# Patient Record
Sex: MALE | Race: WHITE | NOT HISPANIC OR LATINO | Employment: UNEMPLOYED | URBAN - METROPOLITAN AREA
[De-identification: names, ages, dates, MRNs, and addresses within clinical notes are randomized per-mention and may not be internally consistent; named-entity substitution may affect disease eponyms.]

---

## 2023-06-13 LAB
BASOPHILS # BLD AUTO: 0 10E3/UL (ref 0–0.2)
BASOPHILS NFR BLD AUTO: 0 %
EOSINOPHIL # BLD AUTO: 0 10E3/UL (ref 0–0.7)
EOSINOPHIL NFR BLD AUTO: 0 %
ERYTHROCYTE [DISTWIDTH] IN BLOOD BY AUTOMATED COUNT: 13 % (ref 10–15)
HCT VFR BLD AUTO: 44.4 % (ref 40–53)
HGB BLD-MCNC: 14.7 G/DL (ref 13.3–17.7)
IMM GRANULOCYTES # BLD: 0.1 10E3/UL
IMM GRANULOCYTES NFR BLD: 0 %
LYMPHOCYTES # BLD AUTO: 1.5 10E3/UL (ref 0.8–5.3)
LYMPHOCYTES NFR BLD AUTO: 10 %
MCH RBC QN AUTO: 28.4 PG (ref 26.5–33)
MCHC RBC AUTO-ENTMCNC: 33.1 G/DL (ref 31.5–36.5)
MCV RBC AUTO: 86 FL (ref 78–100)
MONOCYTES # BLD AUTO: 1.4 10E3/UL (ref 0–1.3)
MONOCYTES NFR BLD AUTO: 9 %
NEUTROPHILS # BLD AUTO: 12.8 10E3/UL (ref 1.6–8.3)
NEUTROPHILS NFR BLD AUTO: 81 %
NRBC # BLD AUTO: 0 10E3/UL
NRBC BLD AUTO-RTO: 0 /100
PLATELET # BLD AUTO: 340 10E3/UL (ref 150–450)
RBC # BLD AUTO: 5.18 10E6/UL (ref 4.4–5.9)
WBC # BLD AUTO: 15.8 10E3/UL (ref 4–11)

## 2023-06-13 PROCEDURE — 85025 COMPLETE CBC W/AUTO DIFF WBC: CPT | Performed by: EMERGENCY MEDICINE

## 2023-06-13 PROCEDURE — 83690 ASSAY OF LIPASE: CPT | Performed by: EMERGENCY MEDICINE

## 2023-06-13 PROCEDURE — 36415 COLL VENOUS BLD VENIPUNCTURE: CPT | Performed by: EMERGENCY MEDICINE

## 2023-06-13 PROCEDURE — 80053 COMPREHEN METABOLIC PANEL: CPT | Performed by: EMERGENCY MEDICINE

## 2023-06-13 PROCEDURE — 99283 EMERGENCY DEPT VISIT LOW MDM: CPT | Mod: 25

## 2023-06-14 ENCOUNTER — HOSPITAL ENCOUNTER (EMERGENCY)
Facility: CLINIC | Age: 28
Discharge: HOME | End: 2023-06-14
Attending: EMERGENCY MEDICINE | Admitting: EMERGENCY MEDICINE

## 2023-06-14 VITALS
DIASTOLIC BLOOD PRESSURE: 88 MMHG | TEMPERATURE: 98.5 F | HEART RATE: 102 BPM | OXYGEN SATURATION: 98 % | SYSTOLIC BLOOD PRESSURE: 150 MMHG | HEIGHT: 78 IN | BODY MASS INDEX: 36.45 KG/M2 | WEIGHT: 315 LBS | RESPIRATION RATE: 18 BRPM

## 2023-06-14 DIAGNOSIS — R11.10 VOMITING AND DIARRHEA: ICD-10-CM

## 2023-06-14 DIAGNOSIS — R19.7 VOMITING AND DIARRHEA: ICD-10-CM

## 2023-06-14 LAB
ALBUMIN SERPL BCG-MCNC: 3.8 G/DL (ref 3.5–5.2)
ALP SERPL-CCNC: 74 U/L (ref 40–129)
ALT SERPL W P-5'-P-CCNC: 47 U/L (ref 0–70)
ANION GAP SERPL CALCULATED.3IONS-SCNC: 17 MMOL/L (ref 7–15)
AST SERPL W P-5'-P-CCNC: 29 U/L (ref 0–45)
BILIRUB SERPL-MCNC: 0.9 MG/DL
BUN SERPL-MCNC: 5.9 MG/DL (ref 6–20)
CALCIUM SERPL-MCNC: 9.1 MG/DL (ref 8.6–10)
CHLORIDE SERPL-SCNC: 100 MMOL/L (ref 98–107)
CREAT SERPL-MCNC: 0.72 MG/DL (ref 0.67–1.17)
DEPRECATED HCO3 PLAS-SCNC: 22 MMOL/L (ref 22–29)
GFR SERPL CREATININE-BSD FRML MDRD: >90 ML/MIN/1.73M2
GLUCOSE SERPL-MCNC: 133 MG/DL (ref 70–99)
HOLD SPECIMEN: NORMAL
HOLD SPECIMEN: NORMAL
LIPASE SERPL-CCNC: 16 U/L (ref 13–60)
POTASSIUM SERPL-SCNC: 3.5 MMOL/L (ref 3.4–5.3)
PROT SERPL-MCNC: 7.6 G/DL (ref 6.4–8.3)
SODIUM SERPL-SCNC: 139 MMOL/L (ref 136–145)

## 2023-06-14 PROCEDURE — 96360 HYDRATION IV INFUSION INIT: CPT

## 2023-06-14 PROCEDURE — 258N000003 HC RX IP 258 OP 636: Performed by: EMERGENCY MEDICINE

## 2023-06-14 RX ORDER — ONDANSETRON 4 MG/1
4 TABLET, ORALLY DISINTEGRATING ORAL EVERY 6 HOURS PRN
Qty: 10 TABLET | Refills: 0 | Status: SHIPPED | OUTPATIENT
Start: 2023-06-14 | End: 2023-06-17

## 2023-06-14 RX ADMIN — SODIUM CHLORIDE 1000 ML: 9 INJECTION, SOLUTION INTRAVENOUS at 00:50

## 2023-06-14 ASSESSMENT — ACTIVITIES OF DAILY LIVING (ADL): ADLS_ACUITY_SCORE: 35

## 2023-06-14 NOTE — ED TRIAGE NOTES
Patient presents with upper abdominal pain, fever, vomiting, and diarrhea that started on Monday night. He reports being unable to keep anything down.  He describes the abdominal pain as intermittent and sharp.       Triage Assessment     Row Name 06/13/23 4438       Triage Assessment (Adult)    Airway WDL WDL       Respiratory WDL    Respiratory WDL WDL       Skin Circulation/Temperature WDL    Skin Circulation/Temperature WDL WDL       Cardiac WDL    Cardiac WDL X;rhythm     Pulse Rate & Regularity tachycardic        Peripheral/Neurovascular WDL    Peripheral Neurovascular WDL WDL       Cognitive/Neuro/Behavioral WDL    Cognitive/Neuro/Behavioral WDL WDL

## 2023-06-14 NOTE — DISCHARGE INSTRUCTIONS
Zofran as needed for nausea  Imodium as needed for diarrhea  Maintain adequate hydration  Tylenol and/or ibuprofen as needed for pain control

## 2023-06-14 NOTE — ED PROVIDER NOTES
"  History     Chief Complaint:  Abdominal Pain     HPI   Tej Chatman is a 27 year old male who presents to the ED for upper abdominal pain. The patient reports he developed sharp, intermittent upper abdominal pain two days ago with associated nausea, vomiting, and watery diarrhea. Three days ago he had chills and felt warm but did not take his temperature. He has attempted to drinking fluids but notes he vomits this up. He states his vomiting was worse yesterday with roughly 6 episodes. He has had roughly three episodes of nonbloody diarrhea today. The patient has used OTC antinausea medications. He denies known ill contacts.    Independent Historian:   None - Patient Only    Review of External Notes:   N/A    Medications:    No Daily Medications    Past Medical History:    No past medical history     Past Surgical History:    No past surgical history    Physical Exam     Patient Vitals for the past 24 hrs:   BP Temp Temp src Pulse Resp SpO2 Height Weight   06/14/23 0131 -- -- -- -- -- 98 % -- --   06/14/23 0130 (!) 150/88 -- -- 102 -- -- -- --   06/14/23 0100 -- -- -- -- -- 98 % -- --   06/14/23 0051 (!) 148/96 98.5  F (36.9  C) Oral -- -- 98 % -- --   06/13/23 2256 (!) 153/79 99.8  F (37.7  C) Oral (!) 131 18 98 % 2.032 m (6' 8\") (!) 182.9 kg (403 lb 3.2 oz)      Physical Exam  General: Alert and cooperative with exam. Patient in mild distress. Normal mentation. Obese  Head:  Scalp is NC/AT  Eyes:  No scleral icterus, PERRL  ENT:  The external nose and ears are normal. The oropharynx is normal and without erythema; mucus membranes are moist. Uvula midline, no evidence of deep space infection.  Neck:  Normal range of motion without rigidity.  CV:  Mild tachycardia with regular rhythm    No pathologic murmur   Resp:  Breath sounds are clear bilaterally    Non-labored, no retractions or accessory muscle use  GI:  Abdomen is soft, no distension. No peritoneal signs.     Mild diffuse abdominal tenderness.  MS:  No " lower extremity edema   Skin:  Warm and dry, No rash or lesions noted.  Neuro: Oriented x 3. No gross motor deficits.    Emergency Department Course   Laboratory:  Labs Ordered and Resulted from Time of ED Arrival to Time of ED Departure   COMPREHENSIVE METABOLIC PANEL - Abnormal       Result Value    Sodium 139      Potassium 3.5      Chloride 100      Carbon Dioxide (CO2) 22      Anion Gap 17 (*)     Urea Nitrogen 5.9 (*)     Creatinine 0.72      Calcium 9.1      Glucose 133 (*)     Alkaline Phosphatase 74      AST 29      ALT 47      Protein Total 7.6      Albumin 3.8      Bilirubin Total 0.9      GFR Estimate >90     CBC WITH PLATELETS AND DIFFERENTIAL - Abnormal    WBC Count 15.8 (*)     RBC Count 5.18      Hemoglobin 14.7      Hematocrit 44.4      MCV 86      MCH 28.4      MCHC 33.1      RDW 13.0      Platelet Count 340      % Neutrophils 81      % Lymphocytes 10      % Monocytes 9      % Eosinophils 0      % Basophils 0      % Immature Granulocytes 0      NRBCs per 100 WBC 0      Absolute Neutrophils 12.8 (*)     Absolute Lymphocytes 1.5      Absolute Monocytes 1.4 (*)     Absolute Eosinophils 0.0      Absolute Basophils 0.0      Absolute Immature Granulocytes 0.1      Absolute NRBCs 0.0     LIPASE - Normal    Lipase 16        Emergency Department Course & Assessments:     Interventions:  Medications   0.9% sodium chloride BOLUS (0 mLs Intravenous Stopped 6/14/23 0129)      Assessments:  0058 Initial Examination    Independent Interpretation (X-rays, CTs, rhythm strip):  None    Consultations/Discussion of Management or Tests:  None      Social Determinants of Health affecting care:   None    Disposition:  The patient was discharged to home.     Impression & Plan    CMS Diagnoses: None    Medical Decision Making:  Tej Chatman is a 27 year old male who presents to the ED with nausea/vomiting, diarrhea, and abdominal pain. Differential diagnosis includes but is not limited to gastroenteritis, colitis,  bowel obstruction, diverticulitis, electrolyte abnormality, dehydration, UTI. ED evaluation includes reassuring labs though was noted to have mild leukocytosis ( likely stress/demargination reaction from vomiting/diarrhea and dehydration) and mildly elevated anion gap (likely secondary to starvation ketosis from poor oral intake).  Abdominal exam with only mild diffuse tenderness; no indication for CT imaging at this time.  He was noted to be tachycardic in this resolved with IV fluid administration; likely element of dehydration. The patient is tolerating oral fluids and feels well enough for discharge home. With reasonable clinical certainty I feel that the patient is safe for discharge home for ongoing evaluation and management as an outpatient.  Likely viral gastroenteritis.  Return precautions and supportive care discussed.    Diagnosis:    ICD-10-CM    1. Vomiting and diarrhea  R11.10     R19.7          Discharge Medications:  New Prescriptions    ONDANSETRON (ZOFRAN ODT) 4 MG ODT TAB    Take 1 tablet (4 mg) by mouth every 6 hours as needed for nausea      Scribe Disclosure:  I, Nilo Salcido, am serving as a scribe at 12:47 AM on 6/14/2023 to document services personally performed by Rashid Gandhi DO based on my observations and the provider's statements to me.     6/14/2023   Rashid Gandhi DO O'Neill, Christopher Warren, DO  06/14/23 0434

## 2023-06-20 ENCOUNTER — APPOINTMENT (OUTPATIENT)
Dept: CT IMAGING | Facility: CLINIC | Age: 28
DRG: 853 | End: 2023-06-20
Attending: EMERGENCY MEDICINE

## 2023-06-20 ENCOUNTER — HOSPITAL ENCOUNTER (INPATIENT)
Facility: CLINIC | Age: 28
LOS: 8 days | Discharge: HOME | DRG: 853 | End: 2023-06-29
Attending: EMERGENCY MEDICINE | Admitting: SURGERY

## 2023-06-20 ENCOUNTER — OFFICE VISIT (OUTPATIENT)
Dept: URGENT CARE | Facility: URGENT CARE | Age: 28
End: 2023-06-20

## 2023-06-20 VITALS
TEMPERATURE: 100 F | WEIGHT: 315 LBS | BODY MASS INDEX: 43.61 KG/M2 | HEART RATE: 144 BPM | OXYGEN SATURATION: 98 % | RESPIRATION RATE: 20 BRPM | DIASTOLIC BLOOD PRESSURE: 84 MMHG | SYSTOLIC BLOOD PRESSURE: 126 MMHG

## 2023-06-20 DIAGNOSIS — R10.11 BILATERAL UPPER ABDOMINAL PAIN: Primary | ICD-10-CM

## 2023-06-20 DIAGNOSIS — R50.9 FEVER, UNSPECIFIED FEVER CAUSE: ICD-10-CM

## 2023-06-20 DIAGNOSIS — R11.2 NAUSEA AND VOMITING, UNSPECIFIED VOMITING TYPE: ICD-10-CM

## 2023-06-20 DIAGNOSIS — R19.7 DIARRHEA, UNSPECIFIED TYPE: ICD-10-CM

## 2023-06-20 DIAGNOSIS — R10.12 BILATERAL UPPER ABDOMINAL PAIN: Primary | ICD-10-CM

## 2023-06-20 DIAGNOSIS — K63.1 PERFORATION BOWEL (H): ICD-10-CM

## 2023-06-20 DIAGNOSIS — R19.8 PERFORATED VISCUS: Primary | ICD-10-CM

## 2023-06-20 LAB
ALBUMIN SERPL BCG-MCNC: 3.4 G/DL (ref 3.5–5.2)
ALP SERPL-CCNC: 73 U/L (ref 40–129)
ALT SERPL W P-5'-P-CCNC: 35 U/L (ref 0–70)
ANION GAP SERPL CALCULATED.3IONS-SCNC: 14 MMOL/L (ref 7–15)
AST SERPL W P-5'-P-CCNC: 14 U/L (ref 0–45)
BASOPHILS # BLD AUTO: 0.1 10E3/UL (ref 0–0.2)
BASOPHILS NFR BLD AUTO: 0 %
BILIRUB SERPL-MCNC: 0.7 MG/DL
BUN SERPL-MCNC: 8.8 MG/DL (ref 6–20)
CALCIUM SERPL-MCNC: 9 MG/DL (ref 8.6–10)
CHLORIDE SERPL-SCNC: 97 MMOL/L (ref 98–107)
CREAT SERPL-MCNC: 0.94 MG/DL (ref 0.67–1.17)
DEPRECATED HCO3 PLAS-SCNC: 23 MMOL/L (ref 22–29)
EOSINOPHIL # BLD AUTO: 0 10E3/UL (ref 0–0.7)
EOSINOPHIL NFR BLD AUTO: 0 %
ERYTHROCYTE [DISTWIDTH] IN BLOOD BY AUTOMATED COUNT: 13.2 % (ref 10–15)
GFR SERPL CREATININE-BSD FRML MDRD: >90 ML/MIN/1.73M2
GLUCOSE SERPL-MCNC: 128 MG/DL (ref 70–99)
HCT VFR BLD AUTO: 45.5 % (ref 40–53)
HGB BLD-MCNC: 15.1 G/DL (ref 13.3–17.7)
IMM GRANULOCYTES # BLD: 0.2 10E3/UL
IMM GRANULOCYTES NFR BLD: 1 %
LIPASE SERPL-CCNC: 11 U/L (ref 13–60)
LYMPHOCYTES # BLD AUTO: 2 10E3/UL (ref 0.8–5.3)
LYMPHOCYTES NFR BLD AUTO: 10 %
MCH RBC QN AUTO: 28.3 PG (ref 26.5–33)
MCHC RBC AUTO-ENTMCNC: 33.2 G/DL (ref 31.5–36.5)
MCV RBC AUTO: 85 FL (ref 78–100)
MONOCYTES # BLD AUTO: 1.7 10E3/UL (ref 0–1.3)
MONOCYTES NFR BLD AUTO: 9 %
NEUTROPHILS # BLD AUTO: 15.9 10E3/UL (ref 1.6–8.3)
NEUTROPHILS NFR BLD AUTO: 80 %
NRBC # BLD AUTO: 0 10E3/UL
NRBC BLD AUTO-RTO: 0 /100
PLATELET # BLD AUTO: 406 10E3/UL (ref 150–450)
POTASSIUM SERPL-SCNC: 3.9 MMOL/L (ref 3.4–5.3)
PROT SERPL-MCNC: 7.5 G/DL (ref 6.4–8.3)
RADIOLOGIST FLAGS: ABNORMAL
RBC # BLD AUTO: 5.33 10E6/UL (ref 4.4–5.9)
SODIUM SERPL-SCNC: 134 MMOL/L (ref 136–145)
WBC # BLD AUTO: 19.8 10E3/UL (ref 4–11)

## 2023-06-20 PROCEDURE — 80053 COMPREHEN METABOLIC PANEL: CPT | Performed by: EMERGENCY MEDICINE

## 2023-06-20 PROCEDURE — 85025 COMPLETE CBC W/AUTO DIFF WBC: CPT | Performed by: EMERGENCY MEDICINE

## 2023-06-20 PROCEDURE — 258N000003 HC RX IP 258 OP 636: Performed by: EMERGENCY MEDICINE

## 2023-06-20 PROCEDURE — 250N000011 HC RX IP 250 OP 636: Performed by: EMERGENCY MEDICINE

## 2023-06-20 PROCEDURE — 83690 ASSAY OF LIPASE: CPT | Performed by: EMERGENCY MEDICINE

## 2023-06-20 PROCEDURE — 36415 COLL VENOUS BLD VENIPUNCTURE: CPT | Performed by: EMERGENCY MEDICINE

## 2023-06-20 PROCEDURE — 96374 THER/PROPH/DIAG INJ IV PUSH: CPT | Mod: 59

## 2023-06-20 PROCEDURE — 85014 HEMATOCRIT: CPT | Performed by: EMERGENCY MEDICINE

## 2023-06-20 PROCEDURE — 96361 HYDRATE IV INFUSION ADD-ON: CPT

## 2023-06-20 PROCEDURE — 99204 OFFICE O/P NEW MOD 45 MIN: CPT | Performed by: PHYSICIAN ASSISTANT

## 2023-06-20 PROCEDURE — 74177 CT ABD & PELVIS W/CONTRAST: CPT

## 2023-06-20 PROCEDURE — 99285 EMERGENCY DEPT VISIT HI MDM: CPT | Mod: 25

## 2023-06-20 PROCEDURE — 250N000009 HC RX 250: Performed by: EMERGENCY MEDICINE

## 2023-06-20 RX ORDER — IOPAMIDOL 755 MG/ML
135 INJECTION, SOLUTION INTRAVASCULAR ONCE
Status: COMPLETED | OUTPATIENT
Start: 2023-06-20 | End: 2023-06-20

## 2023-06-20 RX ADMIN — SODIUM CHLORIDE 79 ML: 9 INJECTION, SOLUTION INTRAVENOUS at 22:49

## 2023-06-20 RX ADMIN — HYDROMORPHONE HYDROCHLORIDE 1 MG: 1 INJECTION, SOLUTION INTRAMUSCULAR; INTRAVENOUS; SUBCUTANEOUS at 22:22

## 2023-06-20 RX ADMIN — SODIUM CHLORIDE 1000 ML: 9 INJECTION, SOLUTION INTRAVENOUS at 19:29

## 2023-06-20 RX ADMIN — IOPAMIDOL 135 ML: 755 INJECTION, SOLUTION INTRAVENOUS at 22:49

## 2023-06-20 RX ADMIN — SODIUM CHLORIDE 2000 ML: 9 INJECTION, SOLUTION INTRAVENOUS at 22:18

## 2023-06-20 ASSESSMENT — ACTIVITIES OF DAILY LIVING (ADL): ADLS_ACUITY_SCORE: 35

## 2023-06-20 ASSESSMENT — PAIN SCALES - GENERAL: PAINLEVEL: MODERATE PAIN (5)

## 2023-06-20 NOTE — PROGRESS NOTES
"  Assessment & Plan     Bilateral upper abdominal pain    Patient is having worsening and persistent upper abdominal pain, fever, nausea, unable to eat and having diarrhea.  He was seen in the ED a few days ago and was thought to have viral gastroenteritis.  But due to the worsening 9/10 abdominal pain when laying down, fever and persistent abdominal pain patient is being sent to the ED now for imaging, blood work and hydration     Fever, unspecified fever cause    Fever is persisting      Diarrhea, unspecified type    Diarrhea is persisting  Patient has been taking immodium    Nausea and vomiting, unspecified vomiting type    Nausea and vomiting come and go, seemed to have improved but now worsening again      Review of external notes as documented elsewhere in note      BMI:   Estimated body mass index is 43.61 kg/m  as calculated from the following:    Height as of 6/13/23: 2.032 m (6' 8\").    Weight as of this encounter: 180.1 kg (397 lb).     CONSULTATION/REFERRAL to ED for imaging, fluids and blood work    No follow-ups on file.    Josh Couch, [facility], PA-C  [facility] is a 27 year old, presenting for the following health issues:  Abdominal Pain (Feeling nausea, upper abdominal pain, laying down or getting up, vomiting and diarrhea since Saturday.)         View : No data to display.              HPI   Review of Systems   Constitutional, HEENT, cardiovascular, pulmonary, GI, , musculoskeletal, neuro, skin, endocrine and psych systems are negative, except as otherwise noted.      Objective    /84 (BP Location: Right arm, Patient Position: Sitting, Cuff Size: Adult Large)   Pulse (!) 144   Temp 100  F (37.8  C) (Tympanic)   Resp 20   Wt (!) 180.1 kg (397 lb)   SpO2 98%   BMI 43.61 kg/m    Body mass index is 43.61 kg/m .  Physical Exam   GENERAL: moderate distress with sweating and in pain  EYES: Eyes grossly normal to inspection, PERRL and " conjunctivae and sclerae normal  RESP: lungs clear to auscultation - no rales, rhonchi or wheezes  CV: regular rate and rhythm, normal S1 S2, no S3 or S4, no murmur, click or rub, no peripheral edema and peripheral pulses strong  ABDOMEN: tenderness epigastric, RUQ and LUQ  MS: no gross musculoskeletal defects noted, no edema  SKIN: no suspicious lesions or rashes  NEURO: Normal strength and tone, mentation intact and speech normal  PSYCH: mentation appears normal, affect normal/bright

## 2023-06-21 ENCOUNTER — ANESTHESIA EVENT (OUTPATIENT)
Dept: SURGERY | Facility: CLINIC | Age: 28
DRG: 853 | End: 2023-06-21

## 2023-06-21 ENCOUNTER — ANESTHESIA (OUTPATIENT)
Dept: SURGERY | Facility: CLINIC | Age: 28
DRG: 853 | End: 2023-06-21

## 2023-06-21 PROBLEM — R19.8 PERFORATED VISCUS: Status: ACTIVE | Noted: 2023-06-21

## 2023-06-21 LAB
ANION GAP SERPL CALCULATED.3IONS-SCNC: 16 MMOL/L (ref 7–15)
BUN SERPL-MCNC: 8.5 MG/DL (ref 6–20)
CALCIUM SERPL-MCNC: 7.9 MG/DL (ref 8.6–10)
CHLORIDE SERPL-SCNC: 101 MMOL/L (ref 98–107)
CREAT SERPL-MCNC: 0.82 MG/DL (ref 0.67–1.17)
CREAT SERPL-MCNC: 0.83 MG/DL (ref 0.67–1.17)
DEPRECATED HCO3 PLAS-SCNC: 19 MMOL/L (ref 22–29)
ERYTHROCYTE [DISTWIDTH] IN BLOOD BY AUTOMATED COUNT: 13.4 % (ref 10–15)
GFR SERPL CREATININE-BSD FRML MDRD: >90 ML/MIN/1.73M2
GFR SERPL CREATININE-BSD FRML MDRD: >90 ML/MIN/1.73M2
GLUCOSE BLDC GLUCOMTR-MCNC: 234 MG/DL (ref 70–99)
GLUCOSE BLDC GLUCOMTR-MCNC: 240 MG/DL (ref 70–99)
GLUCOSE BLDC GLUCOMTR-MCNC: 249 MG/DL (ref 70–99)
GLUCOSE SERPL-MCNC: 201 MG/DL (ref 70–99)
HBA1C MFR BLD: 5.7 %
HCT VFR BLD AUTO: 48.1 % (ref 40–53)
HGB BLD-MCNC: 15.9 G/DL (ref 13.3–17.7)
LACTATE SERPL-SCNC: 1.1 MMOL/L (ref 0.7–2)
MCH RBC QN AUTO: 28.6 PG (ref 26.5–33)
MCHC RBC AUTO-ENTMCNC: 33.1 G/DL (ref 31.5–36.5)
MCV RBC AUTO: 87 FL (ref 78–100)
PLATELET # BLD AUTO: 489 10E3/UL (ref 150–450)
POTASSIUM SERPL-SCNC: 4.2 MMOL/L (ref 3.4–5.3)
RBC # BLD AUTO: 5.55 10E6/UL (ref 4.4–5.9)
SODIUM SERPL-SCNC: 136 MMOL/L (ref 136–145)
WBC # BLD AUTO: 28.8 10E3/UL (ref 4–11)

## 2023-06-21 PROCEDURE — 258N000001 HC RX 258: Performed by: SURGERY

## 2023-06-21 PROCEDURE — 0DTJ0ZZ RESECTION OF APPENDIX, OPEN APPROACH: ICD-10-PCS | Performed by: SURGERY

## 2023-06-21 PROCEDURE — 88304 TISSUE EXAM BY PATHOLOGIST: CPT | Mod: 26 | Performed by: PATHOLOGY

## 2023-06-21 PROCEDURE — 710N000009 HC RECOVERY PHASE 1, LEVEL 1, PER MIN: Performed by: SURGERY

## 2023-06-21 PROCEDURE — 258N000003 HC RX IP 258 OP 636: Performed by: PHYSICIAN ASSISTANT

## 2023-06-21 PROCEDURE — 250N000009 HC RX 250: Performed by: NURSE ANESTHETIST, CERTIFIED REGISTERED

## 2023-06-21 PROCEDURE — 0DNU4ZZ RELEASE OMENTUM, PERCUTANEOUS ENDOSCOPIC APPROACH: ICD-10-PCS | Performed by: SURGERY

## 2023-06-21 PROCEDURE — 250N000011 HC RX IP 250 OP 636: Performed by: EMERGENCY MEDICINE

## 2023-06-21 PROCEDURE — 250N000025 HC SEVOFLURANE, PER MIN: Performed by: SURGERY

## 2023-06-21 PROCEDURE — 44950 APPENDECTOMY: CPT | Mod: AS | Performed by: PHYSICIAN ASSISTANT

## 2023-06-21 PROCEDURE — 80048 BASIC METABOLIC PNL TOTAL CA: CPT | Performed by: PHYSICIAN ASSISTANT

## 2023-06-21 PROCEDURE — 99221 1ST HOSP IP/OBS SF/LOW 40: CPT | Mod: 57 | Performed by: SURGERY

## 2023-06-21 PROCEDURE — 258N000003 HC RX IP 258 OP 636: Performed by: NURSE PRACTITIONER

## 2023-06-21 PROCEDURE — 250N000009 HC RX 250: Performed by: SURGERY

## 2023-06-21 PROCEDURE — 250N000011 HC RX IP 250 OP 636: Performed by: NURSE ANESTHETIST, CERTIFIED REGISTERED

## 2023-06-21 PROCEDURE — 83605 ASSAY OF LACTIC ACID: CPT | Performed by: NURSE PRACTITIONER

## 2023-06-21 PROCEDURE — 120N000001 HC R&B MED SURG/OB

## 2023-06-21 PROCEDURE — 999N000141 HC STATISTIC PRE-PROCEDURE NURSING ASSESSMENT: Performed by: SURGERY

## 2023-06-21 PROCEDURE — 250N000011 HC RX IP 250 OP 636: Performed by: ANESTHESIOLOGY

## 2023-06-21 PROCEDURE — 80048 BASIC METABOLIC PNL TOTAL CA: CPT | Performed by: NURSE PRACTITIONER

## 2023-06-21 PROCEDURE — 0DB80ZZ EXCISION OF SMALL INTESTINE, OPEN APPROACH: ICD-10-PCS | Performed by: SURGERY

## 2023-06-21 PROCEDURE — 250N000012 HC RX MED GY IP 250 OP 636 PS 637: Performed by: PHYSICIAN ASSISTANT

## 2023-06-21 PROCEDURE — 82565 ASSAY OF CREATININE: CPT | Performed by: PHYSICIAN ASSISTANT

## 2023-06-21 PROCEDURE — 36415 COLL VENOUS BLD VENIPUNCTURE: CPT | Performed by: NURSE PRACTITIONER

## 2023-06-21 PROCEDURE — 36415 COLL VENOUS BLD VENIPUNCTURE: CPT | Performed by: PHYSICIAN ASSISTANT

## 2023-06-21 PROCEDURE — 44950 APPENDECTOMY: CPT | Performed by: SURGERY

## 2023-06-21 PROCEDURE — 99231 SBSQ HOSP IP/OBS SF/LOW 25: CPT | Performed by: NURSE PRACTITIONER

## 2023-06-21 PROCEDURE — 250N000013 HC RX MED GY IP 250 OP 250 PS 637: Performed by: PHYSICIAN ASSISTANT

## 2023-06-21 PROCEDURE — 370N000017 HC ANESTHESIA TECHNICAL FEE, PER MIN: Performed by: SURGERY

## 2023-06-21 PROCEDURE — 258N000003 HC RX IP 258 OP 636: Performed by: NURSE ANESTHETIST, CERTIFIED REGISTERED

## 2023-06-21 PROCEDURE — 83036 HEMOGLOBIN GLYCOSYLATED A1C: CPT | Performed by: PHYSICIAN ASSISTANT

## 2023-06-21 PROCEDURE — 250N000011 HC RX IP 250 OP 636: Performed by: PHYSICIAN ASSISTANT

## 2023-06-21 PROCEDURE — 272N000001 HC OR GENERAL SUPPLY STERILE: Performed by: SURGERY

## 2023-06-21 PROCEDURE — 96375 TX/PRO/DX INJ NEW DRUG ADDON: CPT

## 2023-06-21 PROCEDURE — 360N000076 HC SURGERY LEVEL 3, PER MIN: Performed by: SURGERY

## 2023-06-21 PROCEDURE — 85027 COMPLETE CBC AUTOMATED: CPT | Performed by: NURSE PRACTITIONER

## 2023-06-21 PROCEDURE — 88304 TISSUE EXAM BY PATHOLOGIST: CPT | Mod: TC | Performed by: SURGERY

## 2023-06-21 RX ORDER — NALOXONE HYDROCHLORIDE 0.4 MG/ML
0.2 INJECTION, SOLUTION INTRAMUSCULAR; INTRAVENOUS; SUBCUTANEOUS
Status: DISCONTINUED | OUTPATIENT
Start: 2023-06-21 | End: 2023-06-29 | Stop reason: HOSPADM

## 2023-06-21 RX ORDER — DEXTROSE, SODIUM CHLORIDE, AND POTASSIUM CHLORIDE 5; .45; .15 G/100ML; G/100ML; G/100ML
2000 INJECTION INTRAVENOUS ONCE
Status: COMPLETED | OUTPATIENT
Start: 2023-06-21 | End: 2023-06-22

## 2023-06-21 RX ORDER — CEFAZOLIN SODIUM IN 0.9 % NACL 3 G/100 ML
INTRAVENOUS SOLUTION, PIGGYBACK (ML) INTRAVENOUS PRN
Status: DISCONTINUED | OUTPATIENT
Start: 2023-06-21 | End: 2023-06-21

## 2023-06-21 RX ORDER — FENTANYL CITRATE 50 UG/ML
50 INJECTION, SOLUTION INTRAMUSCULAR; INTRAVENOUS EVERY 5 MIN PRN
Status: DISCONTINUED | OUTPATIENT
Start: 2023-06-21 | End: 2023-06-21 | Stop reason: HOSPADM

## 2023-06-21 RX ORDER — DIPHENHYDRAMINE HYDROCHLORIDE 50 MG/ML
25 INJECTION INTRAMUSCULAR; INTRAVENOUS EVERY 6 HOURS PRN
Status: DISCONTINUED | OUTPATIENT
Start: 2023-06-21 | End: 2023-06-26

## 2023-06-21 RX ORDER — ENOXAPARIN SODIUM 100 MG/ML
40 INJECTION SUBCUTANEOUS EVERY 12 HOURS
Status: DISCONTINUED | OUTPATIENT
Start: 2023-06-21 | End: 2023-06-29 | Stop reason: HOSPADM

## 2023-06-21 RX ORDER — ONDANSETRON 4 MG/1
4 TABLET, ORALLY DISINTEGRATING ORAL EVERY 30 MIN PRN
Status: DISCONTINUED | OUTPATIENT
Start: 2023-06-21 | End: 2023-06-21 | Stop reason: HOSPADM

## 2023-06-21 RX ORDER — NALOXONE HYDROCHLORIDE 0.4 MG/ML
0.4 INJECTION, SOLUTION INTRAMUSCULAR; INTRAVENOUS; SUBCUTANEOUS
Status: DISCONTINUED | OUTPATIENT
Start: 2023-06-21 | End: 2023-06-29 | Stop reason: HOSPADM

## 2023-06-21 RX ORDER — HYDROMORPHONE HYDROCHLORIDE 1 MG/ML
0.3 INJECTION, SOLUTION INTRAMUSCULAR; INTRAVENOUS; SUBCUTANEOUS
Status: DISCONTINUED | OUTPATIENT
Start: 2023-06-21 | End: 2023-06-21

## 2023-06-21 RX ORDER — PROPOFOL 10 MG/ML
INJECTION, EMULSION INTRAVENOUS PRN
Status: DISCONTINUED | OUTPATIENT
Start: 2023-06-21 | End: 2023-06-21

## 2023-06-21 RX ORDER — LIDOCAINE 40 MG/G
CREAM TOPICAL
Status: DISCONTINUED | OUTPATIENT
Start: 2023-06-21 | End: 2023-06-29 | Stop reason: HOSPADM

## 2023-06-21 RX ORDER — HYDROMORPHONE HCL IN WATER/PF 6 MG/30 ML
0.2 PATIENT CONTROLLED ANALGESIA SYRINGE INTRAVENOUS EVERY 5 MIN PRN
Status: DISCONTINUED | OUTPATIENT
Start: 2023-06-21 | End: 2023-06-21 | Stop reason: HOSPADM

## 2023-06-21 RX ORDER — DIAZEPAM 10 MG/2ML
2.5 INJECTION, SOLUTION INTRAMUSCULAR; INTRAVENOUS EVERY 6 HOURS PRN
Status: DISCONTINUED | OUTPATIENT
Start: 2023-06-21 | End: 2023-06-27

## 2023-06-21 RX ORDER — PIPERACILLIN SODIUM, TAZOBACTAM SODIUM 3; .375 G/15ML; G/15ML
3.38 INJECTION, POWDER, LYOPHILIZED, FOR SOLUTION INTRAVENOUS EVERY 6 HOURS
Status: DISCONTINUED | OUTPATIENT
Start: 2023-06-21 | End: 2023-06-27

## 2023-06-21 RX ORDER — ACETAMINOPHEN 325 MG/1
650 TABLET ORAL EVERY 4 HOURS PRN
Status: DISCONTINUED | OUTPATIENT
Start: 2023-06-21 | End: 2023-06-25

## 2023-06-21 RX ORDER — ONDANSETRON 2 MG/ML
4 INJECTION INTRAMUSCULAR; INTRAVENOUS EVERY 30 MIN PRN
Status: DISCONTINUED | OUTPATIENT
Start: 2023-06-21 | End: 2023-06-21 | Stop reason: HOSPADM

## 2023-06-21 RX ORDER — SODIUM CHLORIDE, SODIUM LACTATE, POTASSIUM CHLORIDE, CALCIUM CHLORIDE 600; 310; 30; 20 MG/100ML; MG/100ML; MG/100ML; MG/100ML
INJECTION, SOLUTION INTRAVENOUS CONTINUOUS PRN
Status: DISCONTINUED | OUTPATIENT
Start: 2023-06-21 | End: 2023-06-21

## 2023-06-21 RX ORDER — SODIUM CHLORIDE, SODIUM LACTATE, POTASSIUM CHLORIDE, CALCIUM CHLORIDE 600; 310; 30; 20 MG/100ML; MG/100ML; MG/100ML; MG/100ML
INJECTION, SOLUTION INTRAVENOUS CONTINUOUS
Status: DISCONTINUED | OUTPATIENT
Start: 2023-06-21 | End: 2023-06-21 | Stop reason: HOSPADM

## 2023-06-21 RX ORDER — FENTANYL CITRATE 50 UG/ML
25 INJECTION, SOLUTION INTRAMUSCULAR; INTRAVENOUS EVERY 5 MIN PRN
Status: DISCONTINUED | OUTPATIENT
Start: 2023-06-21 | End: 2023-06-21 | Stop reason: HOSPADM

## 2023-06-21 RX ORDER — ONDANSETRON 2 MG/ML
INJECTION INTRAMUSCULAR; INTRAVENOUS PRN
Status: DISCONTINUED | OUTPATIENT
Start: 2023-06-21 | End: 2023-06-21

## 2023-06-21 RX ORDER — DEXAMETHASONE SODIUM PHOSPHATE 4 MG/ML
INJECTION, SOLUTION INTRA-ARTICULAR; INTRALESIONAL; INTRAMUSCULAR; INTRAVENOUS; SOFT TISSUE PRN
Status: DISCONTINUED | OUTPATIENT
Start: 2023-06-21 | End: 2023-06-21

## 2023-06-21 RX ORDER — ONDANSETRON 4 MG/1
4 TABLET, ORALLY DISINTEGRATING ORAL EVERY 6 HOURS PRN
Status: DISCONTINUED | OUTPATIENT
Start: 2023-06-21 | End: 2023-06-29 | Stop reason: HOSPADM

## 2023-06-21 RX ORDER — FENTANYL CITRATE 50 UG/ML
INJECTION, SOLUTION INTRAMUSCULAR; INTRAVENOUS PRN
Status: DISCONTINUED | OUTPATIENT
Start: 2023-06-21 | End: 2023-06-21

## 2023-06-21 RX ORDER — ONDANSETRON 2 MG/ML
4 INJECTION INTRAMUSCULAR; INTRAVENOUS EVERY 6 HOURS PRN
Status: DISCONTINUED | OUTPATIENT
Start: 2023-06-21 | End: 2023-06-29 | Stop reason: HOSPADM

## 2023-06-21 RX ORDER — HYDRALAZINE HYDROCHLORIDE 20 MG/ML
10 INJECTION INTRAMUSCULAR; INTRAVENOUS EVERY 6 HOURS PRN
Status: DISCONTINUED | OUTPATIENT
Start: 2023-06-21 | End: 2023-06-29 | Stop reason: HOSPADM

## 2023-06-21 RX ORDER — PIPERACILLIN SODIUM, TAZOBACTAM SODIUM 4; .5 G/20ML; G/20ML
4.5 INJECTION, POWDER, LYOPHILIZED, FOR SOLUTION INTRAVENOUS ONCE
Status: COMPLETED | OUTPATIENT
Start: 2023-06-21 | End: 2023-06-21

## 2023-06-21 RX ORDER — NICOTINE POLACRILEX 4 MG
15-30 LOZENGE BUCCAL
Status: DISCONTINUED | OUTPATIENT
Start: 2023-06-21 | End: 2023-06-29 | Stop reason: HOSPADM

## 2023-06-21 RX ORDER — HYDROMORPHONE HYDROCHLORIDE 1 MG/ML
0.5 INJECTION, SOLUTION INTRAMUSCULAR; INTRAVENOUS; SUBCUTANEOUS
Status: DISCONTINUED | OUTPATIENT
Start: 2023-06-21 | End: 2023-06-21

## 2023-06-21 RX ORDER — HYDROMORPHONE HCL IN WATER/PF 6 MG/30 ML
0.4 PATIENT CONTROLLED ANALGESIA SYRINGE INTRAVENOUS EVERY 5 MIN PRN
Status: DISCONTINUED | OUTPATIENT
Start: 2023-06-21 | End: 2023-06-21 | Stop reason: HOSPADM

## 2023-06-21 RX ORDER — PROCHLORPERAZINE MALEATE 10 MG
10 TABLET ORAL EVERY 6 HOURS PRN
Status: DISCONTINUED | OUTPATIENT
Start: 2023-06-21 | End: 2023-06-29 | Stop reason: HOSPADM

## 2023-06-21 RX ORDER — DEXTROSE MONOHYDRATE, SODIUM CHLORIDE, AND POTASSIUM CHLORIDE 50; 1.49; 4.5 G/1000ML; G/1000ML; G/1000ML
INJECTION, SOLUTION INTRAVENOUS CONTINUOUS
Status: DISCONTINUED | OUTPATIENT
Start: 2023-06-21 | End: 2023-06-28

## 2023-06-21 RX ORDER — LIDOCAINE HYDROCHLORIDE 20 MG/ML
INJECTION, SOLUTION INFILTRATION; PERINEURAL PRN
Status: DISCONTINUED | OUTPATIENT
Start: 2023-06-21 | End: 2023-06-21

## 2023-06-21 RX ORDER — FAMOTIDINE 20 MG/1
20 TABLET, FILM COATED ORAL 2 TIMES DAILY
Status: DISCONTINUED | OUTPATIENT
Start: 2023-06-21 | End: 2023-06-29

## 2023-06-21 RX ORDER — DIPHENHYDRAMINE HCL 25 MG
25 CAPSULE ORAL EVERY 6 HOURS PRN
Status: DISCONTINUED | OUTPATIENT
Start: 2023-06-21 | End: 2023-06-26

## 2023-06-21 RX ORDER — HYDROMORPHONE HYDROCHLORIDE 1 MG/ML
INJECTION, SOLUTION INTRAMUSCULAR; INTRAVENOUS; SUBCUTANEOUS PRN
Status: DISCONTINUED | OUTPATIENT
Start: 2023-06-21 | End: 2023-06-21

## 2023-06-21 RX ORDER — SODIUM CHLORIDE, SODIUM LACTATE, POTASSIUM CHLORIDE, CALCIUM CHLORIDE 600; 310; 30; 20 MG/100ML; MG/100ML; MG/100ML; MG/100ML
INJECTION, SOLUTION INTRAVENOUS CONTINUOUS
Status: DISCONTINUED | OUTPATIENT
Start: 2023-06-21 | End: 2023-06-21

## 2023-06-21 RX ORDER — ACETAMINOPHEN 650 MG/1
650 SUPPOSITORY RECTAL EVERY 4 HOURS PRN
Status: DISCONTINUED | OUTPATIENT
Start: 2023-06-21 | End: 2023-06-25

## 2023-06-21 RX ORDER — DEXTROSE MONOHYDRATE 25 G/50ML
25-50 INJECTION, SOLUTION INTRAVENOUS
Status: DISCONTINUED | OUTPATIENT
Start: 2023-06-21 | End: 2023-06-29 | Stop reason: HOSPADM

## 2023-06-21 RX ORDER — MAGNESIUM HYDROXIDE 1200 MG/15ML
LIQUID ORAL PRN
Status: DISCONTINUED | OUTPATIENT
Start: 2023-06-21 | End: 2023-06-21 | Stop reason: HOSPADM

## 2023-06-21 RX ADMIN — ROCURONIUM BROMIDE 20 MG: 50 INJECTION, SOLUTION INTRAVENOUS at 02:20

## 2023-06-21 RX ADMIN — SODIUM CHLORIDE, POTASSIUM CHLORIDE, SODIUM LACTATE AND CALCIUM CHLORIDE: 600; 310; 30; 20 INJECTION, SOLUTION INTRAVENOUS at 06:25

## 2023-06-21 RX ADMIN — PIPERACILLIN AND TAZOBACTAM 3.38 G: 3; .375 INJECTION, POWDER, FOR SOLUTION INTRAVENOUS at 21:29

## 2023-06-21 RX ADMIN — LIDOCAINE HYDROCHLORIDE 100 MG: 20 INJECTION, SOLUTION INFILTRATION; PERINEURAL at 01:12

## 2023-06-21 RX ADMIN — Medication 3 G: at 01:05

## 2023-06-21 RX ADMIN — SODIUM CHLORIDE, POTASSIUM CHLORIDE, SODIUM LACTATE AND CALCIUM CHLORIDE: 600; 310; 30; 20 INJECTION, SOLUTION INTRAVENOUS at 02:37

## 2023-06-21 RX ADMIN — INSULIN ASPART 2 UNITS: 100 INJECTION, SOLUTION INTRAVENOUS; SUBCUTANEOUS at 19:11

## 2023-06-21 RX ADMIN — HYDROMORPHONE HYDROCHLORIDE 0.5 MG: 1 INJECTION, SOLUTION INTRAMUSCULAR; INTRAVENOUS; SUBCUTANEOUS at 07:52

## 2023-06-21 RX ADMIN — SODIUM CHLORIDE, POTASSIUM CHLORIDE, SODIUM LACTATE AND CALCIUM CHLORIDE: 600; 310; 30; 20 INJECTION, SOLUTION INTRAVENOUS at 03:31

## 2023-06-21 RX ADMIN — PHENYLEPHRINE HYDROCHLORIDE 200 MCG: 10 INJECTION INTRAVENOUS at 01:12

## 2023-06-21 RX ADMIN — SODIUM CHLORIDE, POTASSIUM CHLORIDE, SODIUM LACTATE AND CALCIUM CHLORIDE 1000 ML: 600; 310; 30; 20 INJECTION, SOLUTION INTRAVENOUS at 08:56

## 2023-06-21 RX ADMIN — FENTANYL CITRATE 100 MCG: 50 INJECTION, SOLUTION INTRAMUSCULAR; INTRAVENOUS at 02:49

## 2023-06-21 RX ADMIN — ROCURONIUM BROMIDE 30 MG: 50 INJECTION, SOLUTION INTRAVENOUS at 01:30

## 2023-06-21 RX ADMIN — SUCCINYLCHOLINE CHLORIDE 180 MG: 20 INJECTION, SOLUTION INTRAMUSCULAR; INTRAVENOUS; PARENTERAL at 01:13

## 2023-06-21 RX ADMIN — ACETAMINOPHEN 650 MG: 650 SUPPOSITORY RECTAL at 08:51

## 2023-06-21 RX ADMIN — POTASSIUM CHLORIDE, DEXTROSE MONOHYDRATE AND SODIUM CHLORIDE 1000 ML: 150; 5; 450 INJECTION, SOLUTION INTRAVENOUS at 12:50

## 2023-06-21 RX ADMIN — ENOXAPARIN SODIUM 40 MG: 40 INJECTION SUBCUTANEOUS at 21:29

## 2023-06-21 RX ADMIN — HYDROMORPHONE HYDROCHLORIDE 0.5 MG: 1 INJECTION, SOLUTION INTRAMUSCULAR; INTRAVENOUS; SUBCUTANEOUS at 11:24

## 2023-06-21 RX ADMIN — HYDROMORPHONE HYDROCHLORIDE 0.5 MG: 1 INJECTION, SOLUTION INTRAMUSCULAR; INTRAVENOUS; SUBCUTANEOUS at 02:49

## 2023-06-21 RX ADMIN — PROPOFOL 250 MG: 10 INJECTION, EMULSION INTRAVENOUS at 01:12

## 2023-06-21 RX ADMIN — POTASSIUM CHLORIDE, DEXTROSE MONOHYDRATE AND SODIUM CHLORIDE 1000 ML: 150; 5; 450 INJECTION, SOLUTION INTRAVENOUS at 17:30

## 2023-06-21 RX ADMIN — ROCURONIUM BROMIDE 30 MG: 50 INJECTION, SOLUTION INTRAVENOUS at 01:45

## 2023-06-21 RX ADMIN — SODIUM CHLORIDE, POTASSIUM CHLORIDE, SODIUM LACTATE AND CALCIUM CHLORIDE: 600; 310; 30; 20 INJECTION, SOLUTION INTRAVENOUS at 08:44

## 2023-06-21 RX ADMIN — FENTANYL CITRATE 100 MCG: 50 INJECTION, SOLUTION INTRAMUSCULAR; INTRAVENOUS at 01:12

## 2023-06-21 RX ADMIN — ROCURONIUM BROMIDE 40 MG: 50 INJECTION, SOLUTION INTRAVENOUS at 01:21

## 2023-06-21 RX ADMIN — SUGAMMADEX 200 MG: 100 INJECTION, SOLUTION INTRAVENOUS at 03:49

## 2023-06-21 RX ADMIN — POTASSIUM CHLORIDE, DEXTROSE MONOHYDRATE AND SODIUM CHLORIDE: 150; 5; 450 INJECTION, SOLUTION INTRAVENOUS at 11:58

## 2023-06-21 RX ADMIN — FAMOTIDINE 20 MG: 10 INJECTION INTRAVENOUS at 07:53

## 2023-06-21 RX ADMIN — SODIUM CHLORIDE, POTASSIUM CHLORIDE, SODIUM LACTATE AND CALCIUM CHLORIDE: 600; 310; 30; 20 INJECTION, SOLUTION INTRAVENOUS at 09:54

## 2023-06-21 RX ADMIN — POTASSIUM CHLORIDE, DEXTROSE MONOHYDRATE AND SODIUM CHLORIDE: 150; 5; 450 INJECTION, SOLUTION INTRAVENOUS at 21:29

## 2023-06-21 RX ADMIN — PROPOFOL 50 MG: 10 INJECTION, EMULSION INTRAVENOUS at 01:33

## 2023-06-21 RX ADMIN — HYDROMORPHONE HYDROCHLORIDE 0.5 MG: 1 INJECTION, SOLUTION INTRAMUSCULAR; INTRAVENOUS; SUBCUTANEOUS at 06:31

## 2023-06-21 RX ADMIN — PIPERACILLIN AND TAZOBACTAM 4.5 G: 4; .5 INJECTION, POWDER, FOR SOLUTION INTRAVENOUS at 00:31

## 2023-06-21 RX ADMIN — Medication 1 LOZENGE: at 22:02

## 2023-06-21 RX ADMIN — PIPERACILLIN AND TAZOBACTAM 3.38 G: 3; .375 INJECTION, POWDER, FOR SOLUTION INTRAVENOUS at 14:44

## 2023-06-21 RX ADMIN — PIPERACILLIN AND TAZOBACTAM 3.38 G: 3; .375 INJECTION, POWDER, FOR SOLUTION INTRAVENOUS at 07:52

## 2023-06-21 RX ADMIN — DEXAMETHASONE SODIUM PHOSPHATE 4 MG: 4 INJECTION, SOLUTION INTRA-ARTICULAR; INTRALESIONAL; INTRAMUSCULAR; INTRAVENOUS; SOFT TISSUE at 01:17

## 2023-06-21 RX ADMIN — HYDROMORPHONE HYDROCHLORIDE 0.5 MG: 1 INJECTION, SOLUTION INTRAMUSCULAR; INTRAVENOUS; SUBCUTANEOUS at 01:35

## 2023-06-21 RX ADMIN — FAMOTIDINE 20 MG: 10 INJECTION INTRAVENOUS at 21:29

## 2023-06-21 RX ADMIN — ONDANSETRON 4 MG: 2 INJECTION INTRAMUSCULAR; INTRAVENOUS at 02:48

## 2023-06-21 RX ADMIN — MIDAZOLAM 2 MG: 1 INJECTION INTRAMUSCULAR; INTRAVENOUS at 01:06

## 2023-06-21 RX ADMIN — SODIUM CHLORIDE, POTASSIUM CHLORIDE, SODIUM LACTATE AND CALCIUM CHLORIDE: 600; 310; 30; 20 INJECTION, SOLUTION INTRAVENOUS at 01:05

## 2023-06-21 RX ADMIN — HYDROMORPHONE HYDROCHLORIDE 0.4 MG: 0.2 INJECTION, SOLUTION INTRAMUSCULAR; INTRAVENOUS; SUBCUTANEOUS at 04:41

## 2023-06-21 RX ADMIN — Medication: at 13:26

## 2023-06-21 ASSESSMENT — ACTIVITIES OF DAILY LIVING (ADL)
ADLS_ACUITY_SCORE: 28
ADLS_ACUITY_SCORE: 35
ADLS_ACUITY_SCORE: 28
ADLS_ACUITY_SCORE: 29
ADLS_ACUITY_SCORE: 28
ADLS_ACUITY_SCORE: 29
ADLS_ACUITY_SCORE: 28
ADLS_ACUITY_SCORE: 21
ADLS_ACUITY_SCORE: 28
ADLS_ACUITY_SCORE: 35
ADLS_ACUITY_SCORE: 35
ADLS_ACUITY_SCORE: 28

## 2023-06-21 ASSESSMENT — COPD QUESTIONNAIRES: COPD: 0

## 2023-06-21 ASSESSMENT — LIFESTYLE VARIABLES: TOBACCO_USE: 1

## 2023-06-21 NOTE — ANESTHESIA CARE TRANSFER NOTE
Patient: Tej Chatman    Procedure: Procedure(s):  Exploratory Laparoscopy; Converted to open; Appendectomy; Abdominal Lavage; Drain placement       Diagnosis: Bowel perforation (H) [K63.1]  Diagnosis Additional Information: No value filed.    Anesthesia Type:   General     Note:    Oropharynx: oropharynx clear of all foreign objects  Level of Consciousness: awake  Oxygen Supplementation: face mask  Level of Supplemental Oxygen (L/min / FiO2): 6  Independent Airway: airway patency satisfactory and stable  Dentition: dentition unchanged  Vital Signs Stable: post-procedure vital signs reviewed and stable  Report to RN Given: handoff report given  Patient transferred to: PACU    Handoff Report: Identifed the Patient, Identified the Reponsible Provider, Reviewed the pertinent medical history, Discussed the surgical course, Reviewed Intra-OP anesthesia mangement and issues during anesthesia, Set expectations for post-procedure period and Allowed opportunity for questions and acknowledgement of understanding      Vitals:  Vitals Value Taken Time   BP     Temp     Pulse     Resp     SpO2         Electronically Signed By: SHORTY Mares CRNA  June 21, 2023  4:02 AM  
She did not want anything done for her cancer. I spoke with her and her daughter and documented in the notes.  She also refused to see Dr Darryl Roy in the past (last year when this was first found)
left upper arm

## 2023-06-21 NOTE — CODE/RAPID RESPONSE
"[facility]    RRT Note  6/21/2023   Time Called: 0738am    RRT called for: Tachycardia, diaphoresis    Assessment & Plan   IMPRESSION & PLAN:    Tej Chatman is a 27 year old male w/ PMH of obesity,  who was admitted on 6/20/2023 for peritonitis after presenting with abdominal pain, nausea vomiting diarrhea and CT showed free air and fluid.  He underwent ex lap converted to open laparotomy with appendectomy and abdominal lavage.  Bowel was examined and appeared normal with out any definitive perforation identified.  He was started on Zosyn for peritonitis admitted to short stay because of lack of beds on general surgery unit.  Preoperatively patient received 3 L of crystalloid in the emergency department plus maintenance IV fluids in addition to 2 L crystalloid intraoperatively.      On initial exam on the a.m. of 6/21/2023 nursing staff reported that he \"looked unwell\" and was diaphoretic and tachycardic thus prompting RRT activation.  Of note patient was reported to be diaphoretic and tachycardic in the initial surgical consultation note.  Patient reports he feels \"not so great\" endorses abdominal pain 8/10 in severity.  He endorses thirst, denies dyspnea or chest pain.  Reports he was ill for 2 to 3 days prior to admission with anorexia and for 2 days prior to admission had 2-3 episodes of diarrhea per day.  He reports being fairly sedentary prior to admission but has no personal or family history of VTE.    Impression  Sepsis from peritonitis s/p surgical source control albeit no definitive perforation was identified.  He has at least 3 SIRS criteria with tachycardia, tachypnea and leukocytosis and he is on appropriate antimicrobials  Differential diagnosis:  -Considered hypovolemia.  There may be a component of this from insensible losses from anorexia, diarrhea, diaphoresis.  Have to consider bleeding although OUSMANE output is serosanguineous.  Pain may be playing a role.  Also " considered PE though it is quite early in his course but not impossible and other leading diagnoses are more likely    INTERVENTIONS:  -stat cbc--> WBCs 28,000 likely reflective of surgical stress, lactic acid within normal limits at 1.1  -add on remainder of BMP  -give prn apap now  -LR bolus 1000mL (c/o thirst, concentrated urine, likely large insensible losses as noted above)  -please place 2nd peripheral IV so IVF bolus can run concurrently while atbx infusing  -Vital signs every 4 hours    Working diagnosis: Likely ongoing sepsis response from peritonitis, hypovolemia and inadequate analgesia    At the end of the RRT: Lactic acid is reassuring.  Pain down to 3-4/10 after analgesia, heart rate 117, /88    disposition continue current level of care    I have reached out to, awaiting call back from surgical team and defer further cares to the general surgery team--> discussed w/ surgery attending MD and HNAN who will eval him shortly.      If no change in HR post additional volume and analgesia, rec obtaining CTA chest PE protocol to r/o PE    Addendum 0910:  BMP reviewed, notable for HCO3 19, AG 16, Cl is normal as is lactate  -might be 2/2 starvation ketoacidosis, rec starting po diet and/or dextrose containing solution     Code Status: Full Code    Allergies   Allergies   Allergen Reactions     Erythromycin Unknown       Physical Exam   Vital Signs with Ranges:  Temp:  [96.7  F (35.9  C)-100  F (37.8  C)] 97.9  F (36.6  C)  Pulse:  [112-144] 118  Resp:  [10-30] 24  BP: (123-166)/() 144/88  SpO2:  [93 %-98 %] 94 %  I/O last 3 completed shifts:  In: 2000 [I.V.:2000]  Out: 550 [Urine:500; Drains:50]    Constitutional: vs as above and/or per EMR  General:  adult pt lying in bed without acute distress   GCS:   Motor 6=Obeys commands   Verbal 5=Oriented   Eye Opening 4=Spontaneous   Total: 15     Neuro: +follows commands wiggle toes and show 2 fingers bilat, face symmetric, tongue midline, speech  fluent  Eyes pupils equal round 3mm briskly reactive bilat, sclera nonicteric, noninjected, conjunctiva pink,  Head, ENT & mouth: NC/AT,  Dry oral mucosa  Neck: supple  CV S1S2 ST on tele  resp: CTAB upper  lobes  gi:hypooactive bowel sounds, soft, nontender, nondisteded multiple operative intervention sites clean dry intact, OUSMANE x2 with serosanguineous drainage  Ext: no edema or mottling  Skin: no rashes on exposed skin  Lymph: defer  Musculoskeletal no bony joint deformities      Data       IMAGING: (X-ray/CT/MRI)   Recent Results (from the past 24 hour(s))   CT Abdomen Pelvis w Contrast   Result Value    Radiologist flags Perforated viscus (AA)    Narrative    EXAM: CT ABDOMEN PELVIS W CONTRAST  LOCATION: [facility]  DATE: 6/20/2023    INDICATION: diffuse abd pain, n. v d ongoing for over 1 week  COMPARISON: None.  TECHNIQUE: CT scan of the abdomen and pelvis was performed following injection of IV contrast. Multiplanar reformats were obtained. Dose reduction techniques were used.  CONTRAST: 139mL Isovue 370    FINDINGS:   LOWER CHEST: Normal.    HEPATOBILIARY: Diffuse hepatic steatosis. No significant mass. No bile duct dilatation. No calcified gallstones.    PANCREAS: No significant mass, duct dilatation, or inflammatory change.    SPLEEN: Normal size.    ADRENAL GLANDS: No significant nodules.    KIDNEYS/BLADDER: No significant mass, stone, or hydronephrosis.    BOWEL: Small to moderate volume pneumoperitoneum is seen overlying the left lobe of the liver with scattered foci of pneumoperitoneum seen in the abdomen anteriorly. The stomach and duodenum are normal in size and caliber with multiple dilated loops of   jejunum and ileum seen scattered throughout the abdomen with areas of bowel wall thickening and mesenteric stranding present most pronounced in the mid and inferior abdomen (image 80, series 3). No organized intra-abdominal fluid collection is   identified. The colon is  decompressed. The appendix is not visualized.    LYMPH NODES: There is an increased number of subcentimeter mesenteric lymph nodes.    VASCULATURE: Unremarkable.    PELVIC ORGANS: No pelvic masses.    MUSCULOSKELETAL: Unremarkable.      Impression    IMPRESSION:   1.  Multiple dilated loops of predominantly jejunum seen in the mid and lower abdomen w mural thickening, mesenteric stranding and small to moderate volume pneumoperitoneum, with the findings most pronounced in the mid abdomen, concerning for perforated   viscus although the exact site of perforation is unclear it is favored to arise from the small bowel. Mesenteric free fluid and stranding is present without evidence of definitive drainable abscess seen at this time.  2.  Hepatic steatosis      [Critical Result: Perforated viscus]    Finding was identified on 6/20/2023 11:09 PM CDT.     DR. Hay was contacted by me on 6/20/2023 11:20 PM CDT and verbalized understanding of the critical result.       CBC with Diff:  Recent Labs   Lab Test 06/21/23  0820   WBC 28.8*   HGB 15.9   MCV 87   *        Lactic Acid:    1.1    Comprehensive Metabolic Panel:  Recent Labs   Lab 06/21/23  0746 06/21/23  0633 06/20/23  1919   NA  --  136 134*   POTASSIUM  --  4.2 3.9   CHLORIDE  --  101 97*   CO2  --  19* 23   ANIONGAP  --  16* 14   * 201* 128*   BUN  --  8.5 8.8   CR  --  0.83  0.82 0.94   GFRESTIMATED  --  >90  >90 >90   TEMO  --  7.9* 9.0   PROTTOTAL  --   --  7.5   ALBUMIN  --   --  3.4*   BILITOTAL  --   --  0.7   ALKPHOS  --   --  73   AST  --   --  14   ALT  --   --  35     Time Spent on this Encounter   I spent 30 minutes ([id number])on the unit/floor managing the care of Tej Chatman. Over 50% of my time was spent counseling the patient and/or coordinating care regarding services listed in this note.    SHORTY [facility]  Hospitalist Service  [facility]  Securely message with Tractive (more info)  Text page via  AMCOM Paging/Directory

## 2023-06-21 NOTE — PROVIDER NOTIFICATION
MD Notification    Notified Person: MD    Notified Person Name:Summer Bustos NP    Notification Date/Time:06/21/23 6304    Notification Interaction: amcom    Purpose of Notification:Patient diaphoretic.  B/P elevated.  Increased pain.  Tachycardic. Currently on 5L O2 per NC.  (Post surgical patient --bowel  perforation.)      Orders Received:    Comments:  NP currently at bedside.

## 2023-06-21 NOTE — CONSULTS
General surgery consult note    We are being called by the emergency room to evaluate Mr. Chatman as he presents once more to the emergency room with abdominal pain nausea vomiting and diarrhea.  This time his white blood cell count is higher than his previous visit.  CT scan of the abdomen was obtained and free air and fluid is observed mainly in the upper abdomen and among loops of small intestine.  There is also inflammatory changes and several loops of small intestine.  He does take NSAIDs from time to time.  He vapes.  No previous abdominal operations.  On exam he is awake, alert, cooperative, in mild distress.  He is diaphoretic and tachycardic  His abdomen is tender to palpation slightly but not showing peritoneal signs at the moment.    I discussed with him and with his girlfriend the laboratory and radiologic findings.  The recommendations for abdominal exploration laparoscopically were discussed.  He understands the risk, benefits, hopeful outcome, possible complications, and would like to proceed.      Total encounter time 30 minutes, more than half spent in counseling, review of data, and coordination of care.

## 2023-06-21 NOTE — PLAN OF CARE
Goal Outcome Evaluation:  Exploratory Laparoscopy converted to open laparotomy; Appendectomy; Abdominal Lavage; Drain placement  Orientation/Cognitive: A&Ox4  Observation Goals (Met/ Not Met)Inpt  Mobility Level/Assist Equipment:Not OOB  Fall Risk (Y/N): Y  Behavior Concerns: None  Pain Management: Dilaudid IV   Tele/VS/O2: VSS except for HTN  ABNL Lab/BG:AM labs  Diet:NPO except for ice chips  Bowel/Bladder:[place] in place  Skin Concerns: ABD incision, 2 OUSMANE drains  Drains/Devices: R PIV infusing LR  Tests/Procedures for next shift: AM labs  Anticipated DC date & active delays: TBD

## 2023-06-21 NOTE — ED TRIAGE NOTES
Patient here with abdominal pain,nausea ,vomiting and diarrhea since sunday     Triage Assessment     Row Name 06/20/23 1916       Triage Assessment (Adult)    Airway WDL WDL       Respiratory WDL    Respiratory WDL WDL       Skin Circulation/Temperature WDL    Skin Circulation/Temperature WDL WDL       Cardiac WDL    Cardiac WDL WDL       Peripheral/Neurovascular WDL    Peripheral Neurovascular WDL WDL       Cognitive/Neuro/Behavioral WDL    Cognitive/Neuro/Behavioral WDL WDL

## 2023-06-21 NOTE — BRIEF OP NOTE
[facility]    Brief Operative Note    Pre-operative diagnosis: Bowel perforation (H) [K63.1]  Post-operative diagnosis Intra-abdominal free air and free fluid with small bowel dilatation    Procedure:    Exploratory Laparoscopy converted to open laparotomy; Appendectomy; Abdominal Lavage; Drain placement    Surgeon:      * Teddy Bruce MD - Primary     * Linh Watt PA-C - Assisting     Anesthesia: General     Estimated Blood Loss: 40cc    Specimens:   ID Type Source Tests Collected by Time Destination   1 : Appendix Tissue Appendix SURGICAL PATHOLOGY EXAM Teddy Bruce MD 6/21/2023  2:45 AM      Findings:   Moderate amount of cloudy fluid throughout abdomen. Stomach and visible duodenum healthy without any inflammation. Gallbladder WNL. Appendix somewhat dilated and hyperemic but appeared reactive, not perforated. Appendectomy performed. Bowel and colon examined thoroughly. Significant inflammation with fibrinous debris around one area of small bowel. No definite perforation identified. Serosa friable, but bowel actively peristalsing and viable; vistaseal applied to this area. Abdomen copiously irrigated. 2 drains placed, one adjacent to inflamed area of small bowel; second drain in pelvis. NG tube verified within stomach. No immediate complications. See operative report for full details.      Linh Watt PA-C  [facility]  [phone]

## 2023-06-21 NOTE — PLAN OF CARE
AO. 3L NC. Tachy, tachypneic, diaphoretic. Afebrile. Mid abdominal surgical site: CDI dressing; OUSMANE X2: bright red output. Indwelling alexandre cath: adequate output. NG to low int suction: light brown. IS use encouraged. Girlfriend at bedside. Surgery following. PRN dilaudid q 2: not effective. PRN valium available. NPO ex ice chips. 1L bolus given. D5 1/2 NS + 20 meq @ 150, IV zosyn. BG checks q 4: sliding scale insulin ordered. Report given to RN. Patient transferred YADIRA.

## 2023-06-21 NOTE — ANESTHESIA PROCEDURE NOTES
Airway       Patient location during procedure: OR ([facility] - Operating Room or Procedural Area)       Procedure Start/Stop Times: 6/21/2023 1:15 AM  Staff -        Anesthesiologist:  Amauri Mclaughlin MD       CRNA: Lauren Hsu APRN CRNA       Performed By: CRNA  Consent for Airway        Urgency: emergent       Consent: No emergent situation. Verbal consent obtained. Written consent obtained.       Consent given by: patient       Risks and benefits: risks, benefits and alternatives were discussed       Patient identity confirmed: verbally with patient, arm band and hospital-assigned identification number  Indications and Patient Condition       Indications for airway management: tim-procedural       Induction type:intravenous       Mask difficulty assessment: 0 - not attempted    Final Airway Details       Final airway type: endotracheal airway       Successful airway: ETT - single  Endotracheal Airway Details        ETT size (mm): 8.5       Cuffed: yes       Cuff volume (mL): 8       Successful intubation technique: video laryngoscopy       VL Blade Size: MAC 4       Grade View of Cords: 1       Adjucts: stylet       Position: Right       Measured from: gums/teeth       Secured at (cm): 24       Bite block used: None    Post intubation assessment        Number of attempts at approach: 1       Number of other approaches attempted: 0       Secured with: pink tape       Ease of procedure: easy       Dentition: Intact and Unchanged    Medication(s) Administered   Medication Administration Time: 6/21/2023 1:15 AM

## 2023-06-21 NOTE — PLAN OF CARE
Turn and repo, not out of bed yet. [place].  NPO. NG LIS. Pain 6/10, PCA pump started. Incision CDI. JPx2 with serosanguinous output. BP meds available PRN, not given. IVF bolus infusing.

## 2023-06-21 NOTE — ED NOTES
Proceeded to pre op after updates given to the patient.  Iv meds commenced as ordered.  Remained tachycardic,pain scale 2/10.

## 2023-06-21 NOTE — PROGRESS NOTES
Notified provider about indwelling alexandre catheter discussed removal or continued need.    Did provider choose to remove indwelling alexandre catheter? No    Provider's alexandre indication for keeping indwelling alexandre catheter: other    Is there an order for indwelling alexandre catheter? No    *If there is a plan to keep alexandre catheter in place at discharge daily notification with provider is not necessary, but please add a notation in the treatment team sticky note that the patient will be discharging with the catheter.

## 2023-06-21 NOTE — PHARMACY-ADMISSION MEDICATION HISTORY
Pharmacist Admission Medication History    Admission medication history is complete. The information provided in this note is only as accurate as the sources available at the time of the update.    Medication reconciliation/reorder completed by provider prior to medication history? No    Information Source(s): Patient and CareEverywhere/SureScripts via in-person    Pertinent Information: Pt reported not taking any prescription or OTC meds at this time.    Changes made to PTA medication list:    Added: None    Deleted: None    Changed: None    Medication Affordability:  Not including over the counter (OTC) medications, was there a time in the past 3 months when you did not take your medications as prescribed because of cost?: No    Allergies reviewed with patient and updates made in EHR: yes    Medication History Completed By: Zainab Joel RPH 6/21/2023 8:52 AM    Prior to Admission medications    Not on File

## 2023-06-21 NOTE — ANESTHESIA PREPROCEDURE EVALUATION
Anesthesia Pre-Procedure Evaluation    Patient: Tej Chatman   MRN: [id number] : 1995        Procedure : Procedure(s):  Exploratory Laparoscopy          No past medical history on file.   No past surgical history on file.   Allergies   Allergen Reactions     Erythromycin Unknown      Social History     Tobacco Use     Smoking status: Former     Types: Cigarettes, Vaping Device     Smokeless tobacco: Never   Vaping Use     Vaping status: Not on file   Substance Use Topics     Alcohol use: Not on file      Wt Readings from Last 1 Encounters:   23 (!) 181.4 kg (400 lb)        Anesthesia Evaluation   Pt has not had prior anesthetic         ROS/MED HX  ENT/Pulmonary:     (+) tobacco use, Past use,  (-) asthma, COPD and sleep apnea   Neurologic:  - neg neurologic ROS     Cardiovascular:  - neg cardiovascular ROS  (-) murmur   METS/Exercise Tolerance: >4 METS    Hematologic:  - neg hematologic  ROS     Musculoskeletal:  - neg musculoskeletal ROS     GI/Hepatic: Comment: Bowel perforation   (-) GERD and liver disease   Renal/Genitourinary:  - neg Renal ROS     Endo:     (+) Obesity,     Psychiatric/Substance Use:  - neg psychiatric ROS     Infectious Disease:  - neg infectious disease ROS     Malignancy:  - neg malignancy ROS     Other:  - neg other ROS          Physical Exam    Airway        Mallampati: II   TM distance: > 3 FB   Neck ROM: full   Mouth opening: > 3 cm    Respiratory Devices and Support         Dental       (+) Completely normal teeth      Cardiovascular          Rhythm and rate: regular and tachycardia (-) no murmur    Pulmonary           breath sounds clear to auscultation           OUTSIDE LABS:  CBC:   Lab Results   Component Value Date    WBC 19.8 (H) 2023    WBC 15.8 (H) 2023    HGB 15.1 2023    HGB 14.7 2023    HCT 45.5 2023    HCT 44.4 2023     2023     2023     BMP:   Lab Results   Component Value Date     (L)  06/20/2023     06/13/2023    POTASSIUM 3.9 06/20/2023    POTASSIUM 3.5 06/13/2023    CHLORIDE 97 (L) 06/20/2023    CHLORIDE 100 06/13/2023    CO2 23 06/20/2023    CO2 22 06/13/2023    BUN 8.8 06/20/2023    BUN 5.9 (L) 06/13/2023    CR 0.94 06/20/2023    CR 0.72 06/13/2023     (H) 06/20/2023     (H) 06/13/2023     COAGS: No results found for: PTT, INR, FIBR  POC: No results found for: BGM, HCG, HCGS  HEPATIC:   Lab Results   Component Value Date    ALBUMIN 3.4 (L) 06/20/2023    PROTTOTAL 7.5 06/20/2023    ALT 35 06/20/2023    AST 14 06/20/2023    ALKPHOS 73 06/20/2023    BILITOTAL 0.7 06/20/2023     OTHER:   Lab Results   Component Value Date    TEMO 9.0 06/20/2023    LIPASE 11 (L) 06/20/2023       Anesthesia Plan    ASA Status:  2, emergent    NPO Status:  ELEVATED Aspiration Risk/Unknown    Anesthesia Type: General.     - Airway: ETT   Induction: Intravenous, RSI.   Maintenance: Inhalation.   Techniques and Equipment:     - Airway: Video-Laryngoscope         Consents    Anesthesia Plan(s) and associated risks, benefits, and realistic alternatives discussed. Questions answered and patient/representative(s) expressed understanding.    - Discussed:     - Discussed with:  Patient      - Extended Intubation/Ventilatory Support Discussed: Yes.      - Patient is DNR/DNI Status: No    Use of blood products discussed: Yes.     - Discussed with: Patient.     - Consented: consented to blood products            Reason for refusal: other.     Postoperative Care    Pain management: IV analgesics.   PONV prophylaxis: Ondansetron (or other 5HT-3), Dexamethasone or Solumedrol     Comments:                Amauri Mclaughlin MD

## 2023-06-21 NOTE — PROGRESS NOTES
Notified provider about indwelling alexandre catheter discussed removal or continued need.    Did provider choose to remove indwelling alexandre catheter? Yes    Provider's alexandre indication for keeping indwelling alexandre catheter: /GI/GYN Pelvic Procedure .    Is there an order for indwelling alexandre catheter? Yes    *If there is a plan to keep alexandre catheter in place at discharge daily notification with provider is not necessary, but please add a notation in the treatment team sticky note that the patient will be discharging with the catheter.

## 2023-06-21 NOTE — PROGRESS NOTES
General Surgery Progress Note    Admission Date: 6/20/2023  Today's Date: 6/21/2023         Assessment:      Tej Chatman is a 27 year old male who presented with intra-abdominal free air, now s/p exploratory laparoscopy converted to laparotomy with appendectomy, abdominal lavage, and drain placement. No definitive source of perforation identified. Moderate amount of cloudy fluid throughout abdomen. POD 0.    RRT called this morning shortly after returning to floor from PACU.         Plan:   - Appreciate RRT cares, see CNP note for details. Lactate WNL, WBC 28. IV fluids changed to D5 1/2NS +KCl 20 per CNP recommendations  - Pulse remains elevated, 110s-120s with some hypertension. O2 requirement improving. This is not unexpected - robust sepsis response in a young adult. Lactate normal. Anticipate improvement over next 24-48 hours.   - Likely still ongoing insensible fluid losses. Will give additional 2L fluid bolus over the next 8 hours, then resume maintenance IV fluids at 150ml/hr.    - Continue Zosyn q6 hours. Recheck CBC and BMP tomorrow  - IV hydralazine available prn hypertension. Anticipate improved blood pressure as pain control improves  - Continue OUSMANE drains, strip every shift and record output  - Monitor glucose, will add sliding scale insulin and check hemoglobin A1C  - Continue alexandre catheter, monitor UOP.  - Lovenox BID ordered, PCDs while resting. Pepcid IV for GI ppx. Encourage IS use every hour, wean O2 as able.  - Trying IV valium now for pain. Will transition to Dilaudid PCA for better pain control. PO/NM tylenol also available.  - Work on transferring patient out of Observation unit and to surgical floor today    Dispo: unclear, anticipate at least 3 days in hospital for monitoring and recovery. Patient is high risk for ongoing ileus        Interval History:   Tej has been afebrile since surgery but persistently diaphoretic with chills. Pulse 110s-120s with hypertension. O2 requirement  "improving.  RRT called this morning for tachycardia, diaphoresis, pain, O2 requirement.     Patient feeling somewhat better now, a bit more calm and relaxed. Pain still not well-controlled even after recent dose of dilaudid. Urine output somewhat sluggish, seems to be picking up slightly after receiving fluid bolus.           Physical Exam:   BP (!) 154/91 (BP Location: Left arm)   Pulse (!) 123   Temp 97  F (36.1  C) (Oral)   Resp 21   Ht 2.032 m (6' 8\")   Wt (!) 181.4 kg (400 lb)   SpO2 95%   BMI 43.94 kg/m    I/O last 3 completed shifts:  In: 2000 [I.V.:2000]  Out: 550 [Urine:500; Drains:50]  General: awake, alert, oriented x 3. Patient is laying in bed, diaphoretic and slightly uncomfortable-appearing  Respiratory: non-labored breathing   Abdomen: distended, soft, appropriately tender throughout. Incisions are intact with dressings. OUSMANE drains with serosanguinous fluid  Extremities: no lower extremity edema, no calf tenderness. Wearing PCDs    LABS:  Recent Labs   Lab Test 06/21/23  0820 06/20/23 1919 06/13/23  2327   WBC 28.8* 19.8* 15.8*   HGB 15.9 15.1 14.7   MCV 87 85 86   * 406 340      Recent Labs   Lab Test 06/21/23  0633 06/20/23 1919 06/13/23  2327   POTASSIUM 4.2 3.9 3.5   CHLORIDE 101 97* 100   CO2 19* 23 22   BUN 8.5 8.8 5.9*   CR 0.83  0.82 0.94 0.72   ANIONGAP 16* 14 17*      Recent Labs   Lab Test 06/20/23 1919 06/13/23  2327   ALBUMIN 3.4* 3.8   BILITOTAL 0.7 0.9   ALT 35 47   AST 14 29   ALKPHOS 73 74      Recent Labs   Lab Test 06/20/23 1919 06/13/23  2327   LIPASE 11* 16     -------------------------------    Linh Watt PA-C  [facility]  [phone]      "

## 2023-06-21 NOTE — ED PROVIDER NOTES
"  History     Chief Complaint:  Abdominal Pain       The history is provided by the patient and a significant other.      Tej Chatman is a 27 year old male who presents with abdominal pain, nausea, vomiting, and diarrhea since 06/14. He reports that his abdominal pain initially was in his upper abdomen, but has since radiated to his bilateral lower quadrants. He had nausea and vomiting this morning, but notes that it has since decreased. Tej has been using Imodium for his diarrhea, which has been helpful. Significant other mentions that his nausea and vomiting seems to be provoked after eating food.    Independent Historian:   Significant other    Review of External Notes:   Reviewed ED note from 06/14 for vomiting, diarrhea, and upper abdominal pain.  Reviewed UC note from today where he was having persistent upper abdominal pain, nausea, vomiting, and diarrhea.     Medications:    Imodium    Past Medical History:    No past medical history on file and none is mentioned in the room.    Physical Exam     Patient Vitals for the past 24 hrs:   BP Temp Temp src Pulse Resp SpO2 Height Weight   06/21/23 0050 132/81 -- -- -- 16 94 % -- --   06/20/23 2328 127/67 -- -- 113 12 96 % -- --   06/20/23 2200 123/66 98.5  F (36.9  C) Oral (!) 130 10 96 % -- --   06/20/23 1915 125/83 97.2  F (36.2  C) Temporal (!) 138 18 98 % 2.032 m (6' 8\") (!) 181.4 kg (400 lb)        Physical Exam  General/Appearance: appears stated age, well-groomed, appears mildly diaphoretic, obese  Eyes: EOMI, no scleral injection, no icterus  ENT: MMM  Neck: supple, nl ROM, no stiffness  Cardiovascular: tachy but regular, nl S1S2, no m/r/g, 2+ pulses in all 4 extremities, cap refill <2sec  Respiratory: CTAB, good air movement throughout, no wheezes/rhonchi/rales, no increased WOB, no retractions  Back: no lesions  GI: abd soft, non-distended, mild bilateral lower abdominal ttp but habitus limits exam,  no rebound, no guarding, nl BS  MSK: EMMA, " good tone, no bony abnormality  Skin: warm and well-perfused, no rash, no edema, no ecchymosis, nl turgor  Neuro: GCS 15, alert and oriented, no gross focal neuro deficits  Psych: interacts appropriately  Heme: no petechia, no purpura, no active bleeding    Emergency Department Course     Imaging:  CT Abdomen Pelvis w Contrast   Final Result   Abnormal   IMPRESSION:    1.  Multiple dilated loops of predominantly jejunum seen in the mid and lower abdomen w mural thickening, mesenteric stranding and small to moderate volume pneumoperitoneum, with the findings most pronounced in the mid abdomen, concerning for perforated    viscus although the exact site of perforation is unclear it is favored to arise from the small bowel. Mesenteric free fluid and stranding is present without evidence of definitive drainable abscess seen at this time.   2.  Hepatic steatosis         [Critical Result: Perforated viscus]      Finding was identified on 6/20/2023 11:09 PM CDT.       DR. Hay was contacted by me on 6/20/2023 11:20 PM CDT and verbalized understanding of the critical result.         Report per radiology    Laboratory:  Labs Ordered and Resulted from Time of ED Arrival to Time of ED Departure   COMPREHENSIVE METABOLIC PANEL - Abnormal       Result Value    Sodium 134 (*)     Potassium 3.9      Chloride 97 (*)     Carbon Dioxide (CO2) 23      Anion Gap 14      Urea Nitrogen 8.8      Creatinine 0.94      Calcium 9.0      Glucose 128 (*)     Alkaline Phosphatase 73      AST 14      ALT 35      Protein Total 7.5      Albumin 3.4 (*)     Bilirubin Total 0.7      GFR Estimate >90     LIPASE - Abnormal    Lipase 11 (*)    CBC WITH PLATELETS AND DIFFERENTIAL - Abnormal    WBC Count 19.8 (*)     RBC Count 5.33      Hemoglobin 15.1      Hematocrit 45.5      MCV 85      MCH 28.3      MCHC 33.2      RDW 13.2      Platelet Count 406      % Neutrophils 80      % Lymphocytes 10      % Monocytes 9      % Eosinophils 0      % Basophils 0       % Immature Granulocytes 1      NRBCs per 100 WBC 0      Absolute Neutrophils 15.9 (*)     Absolute Lymphocytes 2.0      Absolute Monocytes 1.7 (*)     Absolute Eosinophils 0.0      Absolute Basophils 0.1      Absolute Immature Granulocytes 0.2      Absolute NRBCs 0.0     UA MACROSCOPIC WITH REFLEX TO MICRO AND CULTURE      Emergency Department Course & Assessments:       Interventions:  Medications   lidocaine 1 % 0.1-1 mL (has no administration in time range)   sodium chloride (PF) 0.9% PF flush 3 mL (has no administration in time range)   lactated ringers infusion ( Intravenous Not Given 6/21/23 0100)   sodium chloride 0.9% irrigation (bag) (1,000 mLs Irrigation $Given 6/21/23 0129)   sodium chloride 0.9% (bottle) irrigation (1,000 mLs Irrigation $Given 6/21/23 0129)   0.9% sodium chloride BOLUS (0 mLs Intravenous Stopped 6/20/23 2151)   0.9% sodium chloride BOLUS (0 mLs Intravenous Stopped 6/21/23 0044)   HYDROmorphone (DILAUDID) injection 1 mg (1 mg Intravenous $Given 6/20/23 2222)   iopamidol (ISOVUE-370) solution 135 mL (135 mLs Intravenous $Given 6/20/23 2249)   Saline (79 mLs Intravenous $Given 6/20/23 2249)   piperacillin-tazobactam (ZOSYN) 4.5 g vial to attach to  mL bag (4.5 g Intravenous $New Bag 6/21/23 0031)      Assessments:  2212 I obtained history and examined the patient as noted above.  2328 I rechecked the patient and explained findings.  0025 I rechecked the patient and explained findings.    Independent Interpretation (X-rays, CTs, rhythm strip):  None    Consultations/Discussion of Management or Tests:  2323 I spoke with Dr. Nelson from [place] [facility] regarding the patient's presentation and plan of care.  2338 I spoke with Dr. Bruce from general surgery regarding the patient's presentation and plan of care.  0024 I spoke with Dr. Bruce from general surgery regarding the patient's presentation and plan of care.    Social Determinants of Health affecting care:    None    Disposition:  The patient was transferred to the OR under the care of Dr. Bruce.    Impression & Plan      Medical Decision Making:  This patient is a pleasant 27-year-old male who presents with abdominal pain, nausea and vomiting.  He was mildly diaphoretic and tachycardic.  Additionally his lactate was elevated.  All of this was concerning.  CT was obtained which shows evidence of free air in the abdomen, concerning for ruptured hollow viscus.  I spoke with Dr. Bruce, general surgery, who plans to take him to the OR from the ED.  Here he did receive Zosyn prior to his transfer.    Diagnosis:    ICD-10-CM    1. Perforation bowel (H)  K63.1            Scribe Disclosure:  I, Emerson Kavon, am serving as a scribe at 11:49 PM on 6/20/2023 to document services personally performed by Kate Hay MD, based on my observations and the provider's statements to me.   6/20/2023   Kate Hay MD Mahoney, Katherine Collins, MD  06/21/23 0234

## 2023-06-21 NOTE — ED NOTES
"   [facility]  ED Nurse Handoff Report    ED Chief complaint: Abdominal Pain      ED Diagnosis:   Final diagnoses:   None       Code Status: Full Code    Allergies:   Allergies   Allergen Reactions     Erythromycin Unknown       Patient Story: came today because of crampy abdominal pain and diarrhea.  Focused Assessment:  not distress,tachycardic,alert.    Treatments and/or interventions provided: iv access,ivf,pain reliever,antibiotic,CT  Patient's response to treatments and/or interventions: tolerated    To be done/followed up on inpatient unit:  as per admission order    Does this patient have any cognitive concerns?: none    Activity level - Baseline/Home:  Independent  Activity Level - Current:   Independent    Patient's Preferred language: English   Needed?: No    Isolation: None  Infection: Not Applicable  Patient tested for COVID 19 prior to admission: NO  Bariatric?: Yes    Vital Signs:   Vitals:    06/20/23 1915 06/20/23 2200 06/20/23 2328   BP: 125/83 123/66 127/67   Pulse: (!) 138 (!) 130 113   Resp: 18 10 12   Temp: 97.2  F (36.2  C) 98.5  F (36.9  C)    TempSrc: Temporal Oral    SpO2: 98% 96% 96%   Weight: (!) 181.4 kg (400 lb)     Height: 2.032 m (6' 8\")         Cardiac Rhythm:     Was the PSS-3 completed:   Yes  What interventions are required if any?               Family Comments: given updates of patient's situation  OBS brochure/video discussed/provided to patient/family: No              Name of person given brochure if not patient: none              Relationship to patient: none      For the majority of the shift this patient's behavior was Green.   Behavioral interventions performed were none.    ED NURSE PHONE NUMBER: [phone]       "

## 2023-06-22 ENCOUNTER — APPOINTMENT (OUTPATIENT)
Dept: CT IMAGING | Facility: CLINIC | Age: 28
DRG: 853 | End: 2023-06-22
Attending: PHYSICIAN ASSISTANT

## 2023-06-22 LAB
ANION GAP SERPL CALCULATED.3IONS-SCNC: 14 MMOL/L (ref 7–15)
BASOPHILS # BLD AUTO: 0.1 10E3/UL (ref 0–0.2)
BASOPHILS NFR BLD AUTO: 0 %
BUN SERPL-MCNC: 9.7 MG/DL (ref 6–20)
CA-I BLD-MCNC: 3.8 MG/DL (ref 4.4–5.2)
CALCIUM SERPL-MCNC: 7.6 MG/DL (ref 8.6–10)
CHLORIDE SERPL-SCNC: 104 MMOL/L (ref 98–107)
CREAT SERPL-MCNC: 0.96 MG/DL (ref 0.67–1.17)
DEPRECATED HCO3 PLAS-SCNC: 20 MMOL/L (ref 22–29)
EOSINOPHIL # BLD AUTO: 0 10E3/UL (ref 0–0.7)
EOSINOPHIL NFR BLD AUTO: 0 %
ERYTHROCYTE [DISTWIDTH] IN BLOOD BY AUTOMATED COUNT: 13.9 % (ref 10–15)
GFR SERPL CREATININE-BSD FRML MDRD: >90 ML/MIN/1.73M2
GLUCOSE BLDC GLUCOMTR-MCNC: 104 MG/DL (ref 70–99)
GLUCOSE BLDC GLUCOMTR-MCNC: 133 MG/DL (ref 70–99)
GLUCOSE BLDC GLUCOMTR-MCNC: 139 MG/DL (ref 70–99)
GLUCOSE BLDC GLUCOMTR-MCNC: 145 MG/DL (ref 70–99)
GLUCOSE BLDC GLUCOMTR-MCNC: 166 MG/DL (ref 70–99)
GLUCOSE BLDC GLUCOMTR-MCNC: 169 MG/DL (ref 70–99)
GLUCOSE BLDC GLUCOMTR-MCNC: 169 MG/DL (ref 70–99)
GLUCOSE SERPL-MCNC: 154 MG/DL (ref 70–99)
HCT VFR BLD AUTO: 43.2 % (ref 40–53)
HGB BLD-MCNC: 13.8 G/DL (ref 13.3–17.7)
IMM GRANULOCYTES # BLD: 0.3 10E3/UL
IMM GRANULOCYTES NFR BLD: 1 %
LYMPHOCYTES # BLD AUTO: 2.1 10E3/UL (ref 0.8–5.3)
LYMPHOCYTES NFR BLD AUTO: 8 %
MCH RBC QN AUTO: 28.8 PG (ref 26.5–33)
MCHC RBC AUTO-ENTMCNC: 31.9 G/DL (ref 31.5–36.5)
MCV RBC AUTO: 90 FL (ref 78–100)
MONOCYTES # BLD AUTO: 2.5 10E3/UL (ref 0–1.3)
MONOCYTES NFR BLD AUTO: 10 %
NEUTROPHILS # BLD AUTO: 21.8 10E3/UL (ref 1.6–8.3)
NEUTROPHILS NFR BLD AUTO: 81 %
NRBC # BLD AUTO: 0 10E3/UL
NRBC BLD AUTO-RTO: 0 /100
PATH REPORT.COMMENTS IMP SPEC: NORMAL
PATH REPORT.COMMENTS IMP SPEC: NORMAL
PATH REPORT.FINAL DX SPEC: NORMAL
PATH REPORT.GROSS SPEC: NORMAL
PATH REPORT.MICROSCOPIC SPEC OTHER STN: NORMAL
PATH REPORT.RELEVANT HX SPEC: NORMAL
PHOTO IMAGE: NORMAL
PLATELET # BLD AUTO: 320 10E3/UL (ref 150–450)
POTASSIUM SERPL-SCNC: 4.9 MMOL/L (ref 3.4–5.3)
RBC # BLD AUTO: 4.8 10E6/UL (ref 4.4–5.9)
SODIUM SERPL-SCNC: 138 MMOL/L (ref 136–145)
WBC # BLD AUTO: 26.8 10E3/UL (ref 4–11)

## 2023-06-22 PROCEDURE — 250N000013 HC RX MED GY IP 250 OP 250 PS 637: Performed by: PHYSICIAN ASSISTANT

## 2023-06-22 PROCEDURE — 250N000009 HC RX 250: Performed by: PHYSICIAN ASSISTANT

## 2023-06-22 PROCEDURE — 250N000012 HC RX MED GY IP 250 OP 636 PS 637: Performed by: PHYSICIAN ASSISTANT

## 2023-06-22 PROCEDURE — 258N000003 HC RX IP 258 OP 636: Performed by: SURGERY

## 2023-06-22 PROCEDURE — 71275 CT ANGIOGRAPHY CHEST: CPT

## 2023-06-22 PROCEDURE — 999N000040 HC STATISTIC CONSULT NO CHARGE VASC ACCESS

## 2023-06-22 PROCEDURE — 258N000003 HC RX IP 258 OP 636: Performed by: PHYSICIAN ASSISTANT

## 2023-06-22 PROCEDURE — 80048 BASIC METABOLIC PNL TOTAL CA: CPT | Performed by: PHYSICIAN ASSISTANT

## 2023-06-22 PROCEDURE — 250N000011 HC RX IP 250 OP 636: Performed by: PHYSICIAN ASSISTANT

## 2023-06-22 PROCEDURE — 85025 COMPLETE CBC W/AUTO DIFF WBC: CPT | Performed by: PHYSICIAN ASSISTANT

## 2023-06-22 PROCEDURE — 120N000001 HC R&B MED SURG/OB

## 2023-06-22 PROCEDURE — 999N000127 HC STATISTIC PERIPHERAL IV START W US GUIDANCE

## 2023-06-22 PROCEDURE — 99222 1ST HOSP IP/OBS MODERATE 55: CPT | Mod: AI | Performed by: PHYSICIAN ASSISTANT

## 2023-06-22 PROCEDURE — 36415 COLL VENOUS BLD VENIPUNCTURE: CPT | Performed by: PHYSICIAN ASSISTANT

## 2023-06-22 PROCEDURE — 82330 ASSAY OF CALCIUM: CPT | Performed by: PHYSICIAN ASSISTANT

## 2023-06-22 PROCEDURE — 93010 ELECTROCARDIOGRAM REPORT: CPT | Performed by: INTERNAL MEDICINE

## 2023-06-22 PROCEDURE — 93005 ELECTROCARDIOGRAM TRACING: CPT

## 2023-06-22 RX ORDER — IOPAMIDOL 755 MG/ML
83 INJECTION, SOLUTION INTRAVASCULAR ONCE
Status: COMPLETED | OUTPATIENT
Start: 2023-06-22 | End: 2023-06-22

## 2023-06-22 RX ORDER — NICOTINE 21 MG/24HR
1 PATCH, TRANSDERMAL 24 HOURS TRANSDERMAL DAILY
Status: DISCONTINUED | OUTPATIENT
Start: 2023-06-22 | End: 2023-06-29 | Stop reason: HOSPADM

## 2023-06-22 RX ADMIN — Medication: at 11:46

## 2023-06-22 RX ADMIN — PIPERACILLIN AND TAZOBACTAM 3.38 G: 3; .375 INJECTION, POWDER, FOR SOLUTION INTRAVENOUS at 07:49

## 2023-06-22 RX ADMIN — INSULIN ASPART 1 UNITS: 100 INJECTION, SOLUTION INTRAVENOUS; SUBCUTANEOUS at 08:32

## 2023-06-22 RX ADMIN — DIAZEPAM 2.5 MG: 5 INJECTION INTRAMUSCULAR; INTRAVENOUS at 23:17

## 2023-06-22 RX ADMIN — SODIUM CHLORIDE 100 ML: 9 INJECTION, SOLUTION INTRAVENOUS at 13:07

## 2023-06-22 RX ADMIN — SODIUM CHLORIDE, POTASSIUM CHLORIDE, SODIUM LACTATE AND CALCIUM CHLORIDE 250 ML: 600; 310; 30; 20 INJECTION, SOLUTION INTRAVENOUS at 08:29

## 2023-06-22 RX ADMIN — ENOXAPARIN SODIUM 40 MG: 40 INJECTION SUBCUTANEOUS at 08:24

## 2023-06-22 RX ADMIN — ENOXAPARIN SODIUM 40 MG: 40 INJECTION SUBCUTANEOUS at 19:48

## 2023-06-22 RX ADMIN — PIPERACILLIN AND TAZOBACTAM 3.38 G: 3; .375 INJECTION, POWDER, FOR SOLUTION INTRAVENOUS at 19:47

## 2023-06-22 RX ADMIN — SODIUM CHLORIDE 1000 ML: 9 INJECTION, SOLUTION INTRAVENOUS at 01:20

## 2023-06-22 RX ADMIN — FAMOTIDINE 20 MG: 10 INJECTION INTRAVENOUS at 19:48

## 2023-06-22 RX ADMIN — POTASSIUM CHLORIDE, DEXTROSE MONOHYDRATE AND SODIUM CHLORIDE: 150; 5; 450 INJECTION, SOLUTION INTRAVENOUS at 20:57

## 2023-06-22 RX ADMIN — IOPAMIDOL 83 ML: 755 INJECTION, SOLUTION INTRAVENOUS at 13:06

## 2023-06-22 RX ADMIN — FAMOTIDINE 20 MG: 10 INJECTION INTRAVENOUS at 08:24

## 2023-06-22 RX ADMIN — INSULIN ASPART 1 UNITS: 100 INJECTION, SOLUTION INTRAVENOUS; SUBCUTANEOUS at 04:28

## 2023-06-22 RX ADMIN — POTASSIUM CHLORIDE, DEXTROSE MONOHYDRATE AND SODIUM CHLORIDE: 150; 5; 450 INJECTION, SOLUTION INTRAVENOUS at 13:35

## 2023-06-22 RX ADMIN — PIPERACILLIN AND TAZOBACTAM 3.38 G: 3; .375 INJECTION, POWDER, FOR SOLUTION INTRAVENOUS at 02:16

## 2023-06-22 RX ADMIN — PIPERACILLIN AND TAZOBACTAM 3.38 G: 3; .375 INJECTION, POWDER, FOR SOLUTION INTRAVENOUS at 14:41

## 2023-06-22 RX ADMIN — INSULIN ASPART 1 UNITS: 100 INJECTION, SOLUTION INTRAVENOUS; SUBCUTANEOUS at 00:41

## 2023-06-22 RX ADMIN — Medication 1 LOZENGE: at 00:41

## 2023-06-22 RX ADMIN — NICOTINE 1 PATCH: 14 PATCH, EXTENDED RELEASE TRANSDERMAL at 10:40

## 2023-06-22 RX ADMIN — Medication 1 LOZENGE: at 04:32

## 2023-06-22 ASSESSMENT — ACTIVITIES OF DAILY LIVING (ADL)
ADLS_ACUITY_SCORE: 28
DEPENDENT_IADLS:: INDEPENDENT
ADLS_ACUITY_SCORE: 28

## 2023-06-22 NOTE — PROVIDER NOTIFICATION
MD Notification    Notified Person: MD    Notified Person Name: Dr. Daniel DUNLAP    Notification Date/Time: 6/22/23 110am    Notification Interaction: phone page    Purpose of Notification: HR increased to 130s. Pt asymptomatic. Had RRT earlier today for tachycardia as well. Finished bolus at 930pm and then resumed regular rate of 150mL/hr for maintenance fluid.     Orders Received: Give 1L of NS over 3 hours.     Comments:

## 2023-06-22 NOTE — OP NOTE
SURGEON: Teddy Bruce MD   FIRST ASSISTANT: Linh Watt PA-C, NHAN , The physicians assistant was medically necessary for their expertise in hemostasis, suctioning, suturing, and retraction.    PREOPERATIVE DIAGNOSIS: Perforated viscus.   POSTOPERATIVE DIAGNOSIS: Small bowel mesenteric abscess involving mid small bowel, appendix and omentum.  OPERATIVE PROCEDURE:   1.  Laparoscopic abdominal exploration converted to open.   2.  Appendectomy.   3.  Debridement of small bowel mesenteric abscess.  4.  Lysis of adhesions.    ANESTHESIA: General.   ESTIMATED BLOOD LOSS: Less than 40 mL.   EVENTS: After induction of general endotracheal anesthesia,[facility] abdomen was prepped and draped in the usual sterile fashion.  With a direct visualization trocar in the left upper quadrant the abdomen was entered and pneumoperitoneum established.  2 additional 5 mm trocars were placed along the mid abdomen.  The upper abdomen was examined, the stomach and duodenum as well as gallbladder and liver appeared intact.  Multiple inflammatory adhesions from the omentum to the abdominal wall and to the bowel were taken down and although we could see some of the inflammatory changes in the small intestine, due to the patient's morbid obesity and body habitus the abdominal cavity could not be safely examined.  At this point a midline incision was made sharply, and electrocautery dissection down to and through the abdominal wall fascia. Immediately upon entering the abdomen safely, we encountered more of the cloudy ascites.  No enteric content nor foul smell clearly detected.    At this point we started liberalizing the small intestine proximal and distal to the area of maximal inflammation.  It was evident that the inflammatory changes in the mid small intestine were creating a small bowel obstruction like pattern.  The NG tube location was corroborated.  The ligament of Treitz identified the small intestine followed distally, we  encountered the area of maximal inflammation, as much as the rind from the inflammatory wall of the abscess was removed.  At this point we noted that maximal inflammation on the small intestine itself but no perforation noted.  We followed the small intestine distally until the terminal ileum and cecum were identified.  The appendix was identified and appeared significantly inflamed almost all the way to the appendiceal base.  The appendiceal base appeared normal, and appendectomy was performed by transecting the appendix at its base with a linear stapler taking its mesentery with LigaSure.  Specimen sent to pathology.  We then examined visibly and with palpation the pelvis and the accessible colon and no perforation nor masses noted.  The abdomen was copiously irrigated until effluent was clear and free of debris.  The area of maximal inflammation in the small intestine where some of the serosa was irritated but no perforation of the bowel noted was covered with Vistaseal material and allowed to fall back into the abdomen.  Two 19 Swedish Robb drains were placed to the pelvis until the inflammatory changes in the small intestine and secured to the skin.  Abdominal wall fascia was closed with 0 looped PDS sutures, the wound was irrigated.  Skin was approximated with staples.  Sterile dressings applied. No immediate complications. All counts correct.     FIDELIA JESUS MD

## 2023-06-22 NOTE — PROGRESS NOTES
General surgery note    He is awake, alert, seems comfortable.  NG in place with thin dark green output  JPs with serosanguineous fluid  Kirkland with orange urine  Abdomen soft incision dressed  Afebrile, sinus tach he remains  Laboratory pending  Discussed operative findings, await pathology from appendix.  Encouraged ambulation, he is already having borborygmus.  We will await bowel function return prior to any manipulation of the NG tube.  Okay to have water.  Continue antibiotics, will give a bolus of fluids this morning.

## 2023-06-22 NOTE — PROGRESS NOTES
General Surgery Progress Note    Admission Date: 6/20/2023  Today's Date: 6/22/2023         Assessment:      Tej Chatman is a 27 year old male who presented with intra-abdominal free air, now s/p exploratory laparoscopy converted to laparotomy with appendectomy, abdominal lavage, and drain placement. No definitive source of perforation identified. Moderate amount of cloudy fluid throughout abdomen. POD 1.           Plan:   - Greatly appreciate hospitalist consult and input. CT chest/PE ordered due to persistent tachycardia  - Additional fluid bolus ordered by Dr. Bruce this morning, continue maintenance IV fluids afterwards. Monitor vitals closely  - WBC slightly improved today (28 > 26). Recheck tomorrow. Patient remains afebrile. Continue Zosyn q6 hrs  - Continue NG to LIS, ok for ice chips and a few sips of water. Await bowel function prior to clamping trial / removal. Continue Pepcid IV BID while NPO. Encourage IS use  - On Lovenox BID for DVT ppx, PCDs while resting. OK for heparin if needed if CT positive for PE  - Dilaudid PCA, pain much better controlled today  - Continue alexandre catheter, monitor UOP. Urine remains somewhat dark, having good output  - Hemoglobin A1C noted to be 5.7 yesterday and patient has needed some insulin coverage here. Plan to monitor, work on diet/exercise/lifestyle changes to decrease diabetes risk.  - Social work consulted for discharge planning, primary care follow-up, assistance with finances / lack of insurance  - Consider PT/OT consult in the next 1-2 days pending patient improvement and mobility  - Medical issues being managed by hospitalist. Nicotine patch ordered.    Dispo: anticipate at least 3 more day in hospital        Interval History:   Afebrile, pulse remains elevated 120s-130s. Hypertensive, has not needed IV hydralazine. No longer on supplemental O2, 96% on room air this morning. Tej reports overall feeling better today. No longer sweaty or having chills.  "Pain is much better controlled, using PCA. He has been up out of bed moving a few times since surgery. Taking in some ice chips and water with NG in place. Significant other at bedside during rounds this morning. Discussed plan for CT to rule out PE given several risk factors and ongoing tachycardia. Tej reports vaping excessively, discussed nicotine patch.           Physical Exam:   BP (!) 151/81 (BP Location: Left arm)   Pulse (!) 123   Temp 98.1  F (36.7  C) (Oral)   Resp 19   Ht 2.032 m (6' 8\")   Wt (!) 181.4 kg (400 lb)   SpO2 96%   BMI 43.94 kg/m    I/O last 3 completed shifts:  In: 2866 [I.V.:1866; IV Piggyback:1000]  Out: 2360 [Urine:1650; Emesis/NG output:450; Drains:260]  General: NAD, pleasant, alert and oriented x3. Laying comfortably in bed  Respiratory: non-labored breathing  Abdomen: slightly distended but overall soft with mild appropriate tenderness throughout. Less tender today on exam than yesterday. Incisions are intact with dressings in place. Drains with serous fluid  Extremities: scant symmetric bilateral lower extremity edema, no calf tenderness    LABS:  Recent Labs   Lab Test 06/22/23  0657 06/21/23  0820 06/20/23 1919   WBC 26.8* 28.8* 19.8*   HGB 13.8 15.9 15.1   MCV 90 87 85    489* 406      Recent Labs   Lab Test 06/22/23  0657 06/21/23  0633 06/20/23 1919   POTASSIUM 4.9 4.2 3.9   CHLORIDE 104 101 97*   CO2 20* 19* 23   BUN 9.7 8.5 8.8   CR 0.96 0.83  0.82 0.94   ANIONGAP 14 16* 14     -------------------------------    Linh Watt PA-C  [facility]  [phone]      "

## 2023-06-22 NOTE — PROGRESS NOTES
Notified provider about indwelling alexandre catheter discussed removal or continued need.    Did provider choose to remove indwelling alexandre catheter? No    Provider's alexandre indication for keeping indwelling alexandre catheter: /GI/GYN Pelvic Procedure .    Is there an order for indwelling alexandre catheter? Yes    *If there is a plan to keep alexandre catheter in place at discharge daily notification with provider is not necessary, but please add a notation in the treatment team sticky note that the patient will be discharging with the catheter.

## 2023-06-22 NOTE — CONSULTS
See consult note from today.     Meghna Parikh RN   [facility]   Phone [phone] or [phone]

## 2023-06-22 NOTE — PLAN OF CARE
[id number]    A&Ox4. Still clammy & cold. VSS on RA ex tachy, O2sat 92%. Stood at bedside & marched in place. Pain 2/10, managed with dilaudid PCA pump. Kirkland in place with luke output. NPO ex ice chips. BG q4h, 249 - coverage given. NG LIS, brown output. Abd OUSMANE x2 in place with serosanguinous output. R hand IVF D51/2NS+Kcl20 @250 ml/hr, and when bag is done, reduce dose to 150 ml/hr cont. R wrist PIV SL.

## 2023-06-22 NOTE — CONSULTS
[facility]  Consult Note - Hospitalist Service     Date of Admission:  6/20/2023  Consult Requested by: Linh Watt PA-C  Reason for Consult: Lack of routine medical care. Morbid obesity, HgA1C indicates prediabetes. On sliding scale insulin here. S/p ex lap for free air, source unidentified.  PRIMARY CARE PROVIDER:    No Ref-Primary, Physician    Assessment & Plan   Tej Chatman is a 27 year old male admitted on 6/20/2023.    Past medical history significant for Morbid obesity who was admitted 6/20/23 due to suspected perforated bowel.      Patient presented to the ED with abdominal pain, nausea, vomiting and diarrhea that had been ongoing since 6/14.  The abdominal pain was reported initially in the upper abdomen but has since started to radiate to the lower quadrants bilaterally.  Notably, patient had been assessed previously on 6/14 when symptoms began and was discharged home after receiving IV fluids and received a prescription of Zofran.      Work-up in the ED included a CMP with sodium of 134, chloride of 97, albumin of 3.4 and glucose of 128 otherwise within normal limits.  CBC with diff revealed a WBC of 19.8, Abs neutrophil of 15.9 and Abs monocytes of 1.7 otherwise within normal limits.  Lipase was low at 11.  Abd/pelvis CT with contrast revealed multiple dilated loops of predominately jejunum seen in the mid and lower abdomen with mural thickening, mesenteric stranding and small-moderate volume pneumoperitoneum (findings most pronounced in the mid abdomen and concerning for perforated viscus although exact site is unclear but favored to arise from small bowel), mesenteric free fluid and stranding present w/o evidence of definitive drainable abscess and hepatic steatosis present.      Patient was admitted under the care of General Surgery.  He was taken to the OR and underwent a laparoscopic abdominal exploration converted to open with appendectomy, debridement of small  bowel mesenteric abscess and lysis of adhesions.  RRT was called shortly after patient returned to the floor from PACU due to tachycardia and diaphoresis.      Hospitalist consult has been requested on POD #1 for the following lack of routine medical care. Morbid obesity, HgA1C indicates prediabetes. On sliding scale insulin here. S/p ex lap for free air, source unidentified.    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  - General surgery managing.   --Defer pain/analgesic management, diet advancement, anabiotics, DVT prophylaxis, PT/OT consult.    - Currently on IV Zosyn.  - Trend WBC and monitor vitals closely.  - Pepcid 20 mg BID.      Tachycardia, persistent  - EKG ordered this morning.   --Reviewed and shows sinus tachycardia.    - Monitor on telemetry.    - Chest CT/PE study ordered.    - IV fluids with D5 1/2 NS with 20 mEq of potassium at 150 ml/hr.      Severe obesity  Body mass index is 43.94 kg/m .   Increase in all-cause morbidity and mortality.   - Will need to establish care with a PCP.    - Monitor O2 saturations.    - Marley benefit from outpatient sleep study to assess for LUBNA.      Hyperglycemia  Prediabetes  HGB A1c checked ant at 5.7%.  Patient received IV dexamethasone intra-op and could contribute to hyperglycemia.    - Low intensity sliding scale insulin initiated.    - Glucose checks every 4 hours while NPO.    - Hypoglycemic protocol.    - Will need to establish care with a PCP.    - SW/CM consult requested to assist with establishing patient with PCP.      Hypocalcemia  Could be secondary to aggressive fluids.    - Order ionized calcium level.    - BMP ordered for tomorrow morning.      Alcohol use  Patient indicated that he binge drinks.  He does not consume alcohol on a  "daily basis.  His last drink was Saturday night.    - Monitor.      Tobacco/nicotine use D/O  Patient reported that he vapes quite heavily throughout the day.    - Nicoderm patch.        Clinically Significant Risk Factors          # Hypocalcemia: Lowest Ca = 7.9 mg/dL in last 2 days, will monitor and replace as appropriate     # Hypoalbuminemia: Lowest albumin = 3.4 g/dL at 6/20/2023  7:19 PM, will monitor as appropriate            # Severe Obesity: Estimated body mass index is 43.94 kg/m  as calculated from the following:    Height as of this encounter: 2.032 m (6' 8\").    Weight as of this encounter: 181.4 kg (400 lb)., PRESENT ON ADMISSION            Diet: NPO for Medical/Clinical Reasons Except for: Ice Chips     DVT Prophylaxis: Defer to primary service; currently on subcutaneous Lovenox   Kirkland Catheter: PRESENT, indication: /GI/GYN Pelvic Procedure  Lines: None     Cardiac Monitoring: ACTIVE order. Indication: Tachyarrhythmias, acute (48 hours)  Code Status: Full Code      Disposition Plan    Per General Surgery.      The patient's care was discussed with the Patient and General surgery.    The patient has been discussed with Dr. Pittman, who agrees with the assessment and plan at this time.    At this time, I'd like to thank Linh Watt PA-C for consulting the Hospitalist service.  We will continue to follow.        Morgan Green PA-C  [facility]  Securely message with the [employer] Web Console (learn more here)  Text page via [facility] Paging/Directory    ______________________________________________________________________    Chief Complaint   Abdominal pain, nausea, vomiting and diarrhea.    History is obtained from the patient and EMR.      History of Present Illness   Tej Chatman is a 27 year old male with a past medical history significant for Morbid obesity who was admitted 6/20/23 due to suspected perforated bowel.      Patient presented to the ED with " abdominal pain, nausea, vomiting and diarrhea that had been ongoing since 6/14.  The abdominal pain was reported initially in the upper abdomen but has since started to radiate to the lower quadrants bilaterally.  Notably, patient had been assessed previously on 6/14 when symptoms began and was discharged home after receiving IV fluids and received a prescription of Zofran.      Work-up in the ED included a CMP with sodium of 134, chloride of 97, albumin of 3.4 and glucose of 128 otherwise within normal limits.  CBC with diff revealed a WBC of 19.8, Abs neutrophil of 15.9 and Abs monocytes of 1.7 otherwise within normal limits.  Lipase was low at 11.  Abd/pelvis CT with contrast revealed multiple dilated loops of predominately jejunum seen in the mid and lower abdomen with mural thickening, mesenteric stranding and small-moderate volume pneumoperitoneum (findings most pronounced in the mid abdomen and concerning for perforated viscus although exact site is unclear but favored to arise from small bowel), mesenteric free fluid and stranding present w/o evidence of definitive drainable abscess and hepatic steatosis present.      Patient was admitted under the care of General Surgery.  He was taken to the OR and underwent a laparoscopic abdominal exploration converted to open with appendectomy, debridement of small bowel mesenteric abscess and lysis of adhesions.  RRT was called shortly after patient returned to the floor from PACU due to tachycardia and diaphoresis.      Hospitalist consult has been requested on POD #1 for the following lack of routine medical care. Morbid obesity, HgA1C indicates prediabetes. On sliding scale insulin here. S/p ex lap for free air, source unidentified.    Patient was seen in his hospital room where he was resting comfortably in bed upon arrival.  We reviewed that he has no medical history and is not on any medications PTA.  He is from [place] and recently (last week) drove down here  "with his girlfriend.  He stated he will be in [place] for an extended stay/visit ~ 6 months.      Upon questioning, he indicated that he has had fevers and chills over the last week.  In the last few days he has broken out into a sweat randomly.  He has had increased fatigue and weakness and has not be an active as he normally would be due to the abdominal pain.  He was having persistent abdominal pain with nausea, vomiting and diarrhea since 6/14.      He denied chest pain but stated he has occasionally noticed some palpitations.  He has had some slight back pain and right shoulder pain that he believes is due to the way he was laying in bed as it was difficult to get into a comfortable position due to the abdominal pain.  He has noticed that his urine has been darker.  He does not believe he has been in contact with anyone else that has been ill lately.      Patient and his girlfriend are staying at a house in [place], [place].  He reported that he vapes frequently throughout the day.  He confirmed that he consumes alcohol and reported binge drinking behavior.  He denied use of illicit drugs.  He does not utilize a cane/walker.  He does not use supplemental O2 or a CPAP.      Discussed CODE STATUS and he elected to be FULL CODE.      Returned to patient's room after discussion with General Surgery to inform him of plan regarding chest CT.      Past Medical History    I have reviewed this patient's medical history and updated it with pertinent information if needed.   Morbid Obesity, alcohol use, tobacco/nicotine use D/O    Medications   None     Allergies   Allergies   Allergen Reactions     Erythromycin Unknown     Occurred as a baby. Does not remember reaction.       Physical Exam   BP (!) 151/81 (BP Location: Left arm)   Pulse (!) 123   Temp 98.1  F (36.7  C) (Oral)   Resp 19   Ht 2.032 m (6' 8\")   Wt (!) 181.4 kg (400 lb)   SpO2 96%   BMI 43.94 kg/m      Constitutional: Awake, alert, cooperative, no apparent " distress.    ENT: Normocephalic, without obvious abnormality, atraumatic, oral pharynx with moist mucus membranes, tonsils without erythema or exudates.  Eyes: extra occular movements intact.  Normal sclera.    Neck: Supple, symmetrical, trachea midline, no adenopathy.  Pulmonary: No increased work of breathing, fair air exchange but diminished at the bases, clear to auscultation bilaterally, no crackles or wheezing.  Cardiovascular: Regular rhythm but tachycardic, normal S1 and S2, no S3 or S4, and no murmur noted.  GI: Hypoactive bowel sounds.  Surgical sites bandaged and drains in place.  Soft and obese.    Skin/Integumen: Visualized skin appeared clear.  Neuro: CN II-XII grossly intact.  Upper and lower extremities strength, coordination and sensation intact bilaterally.    Psych:  Alert and oriented x 3. Normal affect.  Extremities: Trace lower extremity edema noted, and calves are non-tender to palpation bilaterally.   : Kirkland catheter in place with urine in the bag.      Medical Decision Making       Please see A&P for additional details of medical decision making.  Greater than 60 MINUTES SPENT BY ME on the date of service doing chart review, history, exam, documentation & further activities per the note.         Data   I personally reviewed the EKG tracing showing sinus tachycardia.  Results for orders placed or performed during the hospital encounter of 06/20/23 (from the past 24 hour(s))   Glucose by meter   Result Value Ref Range    GLUCOSE BY METER POCT 240 (H) 70 - 99 mg/dL   Glucose by meter   Result Value Ref Range    GLUCOSE BY METER POCT 249 (H) 70 - 99 mg/dL   Glucose by meter   Result Value Ref Range    GLUCOSE BY METER POCT 169 (H) 70 - 99 mg/dL   Glucose by meter   Result Value Ref Range    GLUCOSE BY METER POCT 145 (H) 70 - 99 mg/dL   Glucose by meter   Result Value Ref Range    GLUCOSE BY METER POCT 169 (H) 70 - 99 mg/dL   CBC with platelets differential    Narrative    The following orders  were created for panel order CBC with platelets differential.  Procedure                               Abnormality         Status                     ---------                               -----------         ------                     CBC with platelets and d...[[id number]]  Abnormal            Final result                 Please view results for these tests on the individual orders.   Basic metabolic panel   Result Value Ref Range    Sodium 138 136 - 145 mmol/L    Potassium 4.9 3.4 - 5.3 mmol/L    Chloride 104 98 - 107 mmol/L    Carbon Dioxide (CO2) 20 (L) 22 - 29 mmol/L    Anion Gap 14 7 - 15 mmol/L    Urea Nitrogen 9.7 6.0 - 20.0 mg/dL    Creatinine 0.96 0.67 - 1.17 mg/dL    Calcium 7.6 (L) 8.6 - 10.0 mg/dL    Glucose 154 (H) 70 - 99 mg/dL    GFR Estimate >90 >60 mL/min/1.73m2   CBC with platelets and differential   Result Value Ref Range    WBC Count 26.8 (H) 4.0 - 11.0 10e3/uL    RBC Count 4.80 4.40 - 5.90 10e6/uL    Hemoglobin 13.8 13.3 - 17.7 g/dL    Hematocrit 43.2 40.0 - 53.0 %    MCV 90 78 - 100 fL    MCH 28.8 26.5 - 33.0 pg    MCHC 31.9 31.5 - 36.5 g/dL    RDW 13.9 10.0 - 15.0 %    Platelet Count 320 150 - 450 10e3/uL    % Neutrophils 81 %    % Lymphocytes 8 %    % Monocytes 10 %    % Eosinophils 0 %    % Basophils 0 %    % Immature Granulocytes 1 %    NRBCs per 100 WBC 0 <1 /100    Absolute Neutrophils 21.8 (H) 1.6 - 8.3 10e3/uL    Absolute Lymphocytes 2.1 0.8 - 5.3 10e3/uL    Absolute Monocytes 2.5 (H) 0.0 - 1.3 10e3/uL    Absolute Eosinophils 0.0 0.0 - 0.7 10e3/uL    Absolute Basophils 0.1 0.0 - 0.2 10e3/uL    Absolute Immature Granulocytes 0.3 <=0.4 10e3/uL    Absolute NRBCs 0.0 10e3/uL   EKG 12-lead, tracing only   Result Value Ref Range    Systolic Blood Pressure  mmHg    Diastolic Blood Pressure  mmHg    Ventricular Rate 125 BPM    Atrial Rate 125 BPM    DC Interval 118 ms    QRS Duration 96 ms     ms    QTc 461 ms    P Axis 37 degrees    R AXIS 7 degrees    T Axis 29 degrees     Interpretation ECG       Sinus tachycardia  Otherwise normal ECG  No previous ECGs available     Glucose by meter   Result Value Ref Range    GLUCOSE BY METER POCT 166 (H) 70 - 99 mg/dL

## 2023-06-22 NOTE — PLAN OF CARE
POD#1 Exploratory Laparoscopy; Converted to open; Appendectomy; Abdominal Lavage; Drain placement  A&Ox4. VSS on 1L NC. NPO, sips of water ok. Up with 1-2 and gb. PCA pump in use, Fluids at 150ml/hr. NG to LIS. OUSMANE drain x2 to LLQ. Kirkland in place. Tele . , 139, 133. Chest CT this afternoon neg for PE. Surgery following.

## 2023-06-22 NOTE — PLAN OF CARE
Goal Outcome Evaluation:      Plan of Care Reviewed With: patient    Overall Patient Progress: no changeOverall Patient Progress: no change     POD#1 for exp laparoscopy converted to laparotomy with appendectomy, abdominal lavage, and drain placement. Tachycardia reached 130s, pt asymptomatic and MD notified and 1L bolus of NS given over 3 hours. HTN noted at times. Placed on 1LPM NC d/t O2 sats dipping during sleep. Other vitals stable. PIV running D51/2NS+KCL at 150mL/hr. Int IV abx given. PCA pump in use, pain managed. Midline inc CDI. 2 OUSMANE drains with minimal serosanguinous output noted, stripped per POC. NPO exc ice. NG to LIS with brown output, at 73cm landmark unchanged. Pt shifting weight independently, writer provided frequent reminders to repo. Kirkland with orange urine output. BS hypo, denies passing flatus. Educated on IS. LS clear. Up A2 stand at bedside. Hospitalist to consult. Will continue to monitor.

## 2023-06-22 NOTE — PROGRESS NOTES
General Surgery - Chart Update    Prelim result of CT Chest negative for PE. Some likely dependent atelectasis and trace pleural fluid noted, not unexpected.    Continue current cares, no change to plan at this time. Again greatly appreciate input from hospitalist team. We will continue to monitor vitals and fever curve closely, recheck labs tomorrow.    Linh Watt PA-C  [facility]  [phone]

## 2023-06-23 LAB
ANION GAP SERPL CALCULATED.3IONS-SCNC: 10 MMOL/L (ref 7–15)
ATRIAL RATE - MUSE: 125 BPM
BASOPHILS # BLD AUTO: 0 10E3/UL (ref 0–0.2)
BASOPHILS NFR BLD AUTO: 0 %
BUN SERPL-MCNC: 6.2 MG/DL (ref 6–20)
CALCIUM SERPL-MCNC: 7.7 MG/DL (ref 8.6–10)
CHLORIDE SERPL-SCNC: 104 MMOL/L (ref 98–107)
CREAT SERPL-MCNC: 0.76 MG/DL (ref 0.67–1.17)
DEPRECATED HCO3 PLAS-SCNC: 24 MMOL/L (ref 22–29)
DIASTOLIC BLOOD PRESSURE - MUSE: NORMAL MMHG
EOSINOPHIL # BLD AUTO: 0 10E3/UL (ref 0–0.7)
EOSINOPHIL NFR BLD AUTO: 0 %
ERYTHROCYTE [DISTWIDTH] IN BLOOD BY AUTOMATED COUNT: 13.9 % (ref 10–15)
GFR SERPL CREATININE-BSD FRML MDRD: >90 ML/MIN/1.73M2
GLUCOSE BLDC GLUCOMTR-MCNC: 101 MG/DL (ref 70–99)
GLUCOSE BLDC GLUCOMTR-MCNC: 106 MG/DL (ref 70–99)
GLUCOSE BLDC GLUCOMTR-MCNC: 118 MG/DL (ref 70–99)
GLUCOSE BLDC GLUCOMTR-MCNC: 121 MG/DL (ref 70–99)
GLUCOSE BLDC GLUCOMTR-MCNC: 130 MG/DL (ref 70–99)
GLUCOSE SERPL-MCNC: 146 MG/DL (ref 70–99)
HCT VFR BLD AUTO: 37.2 % (ref 40–53)
HGB BLD-MCNC: 11.6 G/DL (ref 13.3–17.7)
IMM GRANULOCYTES # BLD: 0.1 10E3/UL
IMM GRANULOCYTES NFR BLD: 1 %
INTERPRETATION ECG - MUSE: NORMAL
LYMPHOCYTES # BLD AUTO: 1.6 10E3/UL (ref 0.8–5.3)
LYMPHOCYTES NFR BLD AUTO: 11 %
MCH RBC QN AUTO: 28.3 PG (ref 26.5–33)
MCHC RBC AUTO-ENTMCNC: 31.2 G/DL (ref 31.5–36.5)
MCV RBC AUTO: 91 FL (ref 78–100)
MONOCYTES # BLD AUTO: 1.5 10E3/UL (ref 0–1.3)
MONOCYTES NFR BLD AUTO: 10 %
NEUTROPHILS # BLD AUTO: 12.1 10E3/UL (ref 1.6–8.3)
NEUTROPHILS NFR BLD AUTO: 78 %
NRBC # BLD AUTO: 0 10E3/UL
NRBC BLD AUTO-RTO: 0 /100
P AXIS - MUSE: 37 DEGREES
PLATELET # BLD AUTO: 390 10E3/UL (ref 150–450)
POTASSIUM SERPL-SCNC: 4.1 MMOL/L (ref 3.4–5.3)
PR INTERVAL - MUSE: 118 MS
QRS DURATION - MUSE: 96 MS
QT - MUSE: 320 MS
QTC - MUSE: 461 MS
R AXIS - MUSE: 7 DEGREES
RBC # BLD AUTO: 4.1 10E6/UL (ref 4.4–5.9)
SODIUM SERPL-SCNC: 138 MMOL/L (ref 136–145)
SYSTOLIC BLOOD PRESSURE - MUSE: NORMAL MMHG
T AXIS - MUSE: 29 DEGREES
VENTRICULAR RATE- MUSE: 125 BPM
WBC # BLD AUTO: 15.4 10E3/UL (ref 4–11)

## 2023-06-23 PROCEDURE — 82310 ASSAY OF CALCIUM: CPT | Performed by: PHYSICIAN ASSISTANT

## 2023-06-23 PROCEDURE — 258N000003 HC RX IP 258 OP 636: Performed by: PHYSICIAN ASSISTANT

## 2023-06-23 PROCEDURE — 250N000011 HC RX IP 250 OP 636: Mod: JZ | Performed by: PHYSICIAN ASSISTANT

## 2023-06-23 PROCEDURE — 250N000013 HC RX MED GY IP 250 OP 250 PS 637: Performed by: PHYSICIAN ASSISTANT

## 2023-06-23 PROCEDURE — 99232 SBSQ HOSP IP/OBS MODERATE 35: CPT | Performed by: HOSPITALIST

## 2023-06-23 PROCEDURE — 120N000001 HC R&B MED SURG/OB

## 2023-06-23 PROCEDURE — 36415 COLL VENOUS BLD VENIPUNCTURE: CPT | Performed by: PHYSICIAN ASSISTANT

## 2023-06-23 PROCEDURE — 85025 COMPLETE CBC W/AUTO DIFF WBC: CPT | Performed by: PHYSICIAN ASSISTANT

## 2023-06-23 RX ADMIN — PIPERACILLIN AND TAZOBACTAM 3.38 G: 3; .375 INJECTION, POWDER, FOR SOLUTION INTRAVENOUS at 14:02

## 2023-06-23 RX ADMIN — POTASSIUM CHLORIDE, DEXTROSE MONOHYDRATE AND SODIUM CHLORIDE: 150; 5; 450 INJECTION, SOLUTION INTRAVENOUS at 03:53

## 2023-06-23 RX ADMIN — PIPERACILLIN AND TAZOBACTAM 3.38 G: 3; .375 INJECTION, POWDER, FOR SOLUTION INTRAVENOUS at 01:40

## 2023-06-23 RX ADMIN — PIPERACILLIN AND TAZOBACTAM 3.38 G: 3; .375 INJECTION, POWDER, FOR SOLUTION INTRAVENOUS at 08:39

## 2023-06-23 RX ADMIN — ENOXAPARIN SODIUM 40 MG: 40 INJECTION SUBCUTANEOUS at 08:40

## 2023-06-23 RX ADMIN — FAMOTIDINE 20 MG: 10 INJECTION INTRAVENOUS at 08:40

## 2023-06-23 RX ADMIN — ENOXAPARIN SODIUM 40 MG: 40 INJECTION SUBCUTANEOUS at 19:43

## 2023-06-23 RX ADMIN — POTASSIUM CHLORIDE, DEXTROSE MONOHYDRATE AND SODIUM CHLORIDE: 150; 5; 450 INJECTION, SOLUTION INTRAVENOUS at 10:51

## 2023-06-23 RX ADMIN — POTASSIUM CHLORIDE, DEXTROSE MONOHYDRATE AND SODIUM CHLORIDE: 150; 5; 450 INJECTION, SOLUTION INTRAVENOUS at 18:28

## 2023-06-23 RX ADMIN — PIPERACILLIN AND TAZOBACTAM 3.38 G: 3; .375 INJECTION, POWDER, FOR SOLUTION INTRAVENOUS at 19:43

## 2023-06-23 RX ADMIN — FAMOTIDINE 20 MG: 20 TABLET ORAL at 19:43

## 2023-06-23 ASSESSMENT — ACTIVITIES OF DAILY LIVING (ADL)
ADLS_ACUITY_SCORE: 28

## 2023-06-23 NOTE — PROVIDER NOTIFICATION
"Per Dr. Bruce's note on 6/23. \"Continue Alexandre for accurate I&O measurement\"    Did provider choose to remove indwelling alexandre catheter? No    Provider's alexandre indication for keeping indwelling alexandre catheter: Strict 1-2 Hour I & O if external catheters are not an option.    Is there an order for indwelling alexandre catheter? Yes    *If there is a plan to keep alexandre catheter in place at discharge daily notification with provider is not necessary, but please add a notation in the treatment team sticky note that the patient will be discharging with the catheter.   "

## 2023-06-23 NOTE — PROGRESS NOTES
Clamp trails done today, 6hrs off and 2hrs on. NG to stay in place. Diet advanced to clears. Pain managed with PCA Dilaudid. Kirkland in place with cloudy/stain output. 2 OUSMANE in place with serous output. Midline incision cdi. Up with Ax1

## 2023-06-23 NOTE — PLAN OF CARE
[id number]. A&Ox4, VSS on 1L  NC. Patient pain is controlled with PCA. Denies nausea. NG in place at LIS, 73cm, green output. NPO but allowed sips of water. Up with Ax1-2 with gb. And incisions dressings CDI. JPx2, ULQ, LLQ. Kirkland draining adequately. Patient tele is Sinus tach. Continue to monitor.

## 2023-06-23 NOTE — PROGRESS NOTES
Hospitalist       Provider Role Specialty From [facility]    Hospitalist Progress Note    Date of Service (when I saw the patient): 06/23/2023    Assessment & Plan   Tej Chatman is a 27 year old male admitted on 6/20/2023.     Past medical history significant for Morbid obesity who was admitted 6/20/23 due to suspected perforated bowel.       Patient presented to the ED with abdominal pain, nausea, vomiting and diarrhea that had been ongoing since 6/14.  Abd/pelvis CT with contrast revealed multiple dilated loops of predominately jejunum seen in the mid and lower abdomen with mural thickening, mesenteric stranding and small-moderate volume pneumoperitoneum (findings most pronounced in the mid abdomen and concerning for perforated viscus although exact site is unclear but favored to arise from small bowel), mesenteric free fluid and stranding present w/o evidence of definitive drainable abscess and hepatic steatosis present.       S/P laparoscopic abdominal exploration converted to open with appendectomy, debridement of small bowel mesenteric abscess and lysis of adhesions.        Hospitalist consult has been requested on POD #1 medical management. .       Perforated bowel  Small bowel mesenteric abscess involving mid small bowel, appendix and omentum   S/p laparoscopic abdominal exploration converted to open with appendectomy, debridement of small bowel mesenteric abscess and lysis of adhesions  Afebrile. Wbc count trending down. On zosyn.  - Plan per surgery.        Tachycardia, persistent  Likely due to stress (surgery, hospitalization), infection, pain.   EKG with sinus tachycardia.    CT chest w/o PE.  Improved tachy (around 100 now).  - Monitor on telemetry.    - Chest CT/PE study ordered.    - Hydartion.     Severe obesity   - Will need to establish care with a PCP.    - Monitor O2 saturations.    - Marley benefit from outpatient sleep study to assess for LUBNA (smores)..        Hyperglycemia  Prediabetes  HGB A1c checked ant at 5.7%.  Patient received IV dexamethasone intra-op and could contribute to hyperglycemia.    - Low intensity sliding scale insulin initiated.    - Nutrition consult.    - SW/CM consult requested to assist with establishing patient with PCP.       Hypocalcemia  Could be secondary to aggressive fluids.    - Order ionized calcium level.    - BMP ordered for tomorrow morning.       Alcohol use  Patient indicated that he binge drinks.  He does not consume alcohol on a daily basis.  His last drink was Saturday night.    - Monitor.       Tobacco/nicotine use D/O  Patient reported that he vapes quite heavily throughout the day.    - Nicoderm patch.          DVT Prophylaxis: Defer to primary service.    Disp: per primary team.  Needs PCP.      Medicine will continue to follow with you.        Ok Maradiaga MD    Interval History   Mild abd pain. Passed gas earlier. No sob. No cp.      Physical Exam   Temp: 98.5  F (36.9  C) Temp src: Oral BP: (!) 141/84 Pulse: 107   Resp: 18 SpO2: 94 % O2 Device: None (Room air) Oxygen Delivery: 2 LPM  Vitals:    06/20/23 1915   Weight: (!) 181.4 kg (400 lb)     Vital Signs with Ranges  Temp:  [97.8  F (36.6  C)-98.5  F (36.9  C)] 98.5  F (36.9  C)  Pulse:  [107-120] 107  Resp:  [18] 18  BP: (108-181)/() 141/84  SpO2:  [92 %-96 %] 94 %  I/O last 3 completed shifts:  In: 754 [I.V.:754]  Out: 2825 [Urine:1650; Emesis/NG output:1100; Drains:75]    Constitutional: Appears comfortable. Obese.  Respiratory: CTAB.  Cardiovascular: RRR.  GI: post-surgical clips. Drain in place.  Skin/Integumen: no peripheral edema.  Other:      Medications     dextrose 5% and 0.45% NaCl + KCl 20 mEq/L 150 mL/hr at 06/23/23 0353     HYDROmorphone         enoxaparin ANTICOAGULANT  40 mg Subcutaneous Q12H     famotidine  20 mg Oral BID    Or     famotidine  20 mg Intravenous BID     insulin aspart  1-4 Units Subcutaneous Q4H     nicotine  1 patch Transdermal  Daily     nicotine   Transdermal Q8H     piperacillin-tazobactam  3.375 g Intravenous Q6H     sodium chloride (PF)  3 mL Intracatheter Q8H       Data   Recent Labs   Lab 06/23/23  0737 06/23/23  0404 06/23/23  0035 06/22/23  0756 06/22/23  0657 06/21/23  1258 06/21/23  0820 06/21/23  0746 06/21/23  0633 06/20/23  1919   WBC 15.4*  --   --   --  26.8*  --  28.8*  --   --  19.8*   HGB 11.6*  --   --   --  13.8  --  15.9  --   --  15.1   MCV 91  --   --   --  90  --  87  --   --  85     --   --   --  320  --  489*  --   --  406     --   --   --  138  --   --   --  136 134*   POTASSIUM 4.1  --   --   --  4.9  --   --   --  4.2 3.9   CHLORIDE 104  --   --   --  104  --   --   --  101 97*   CO2 24  --   --   --  20*  --   --   --  19* 23   BUN 6.2  --   --   --  9.7  --   --   --  8.5 8.8   CR 0.76  --   --   --  0.96  --   --   --  0.83  0.82 0.94   ANIONGAP 10  --   --   --  14  --   --   --  16* 14   TEMO 7.7*  --   --   --  7.6*  --   --   --  7.9* 9.0   * 106* 130*   < > 154*   < >  --    < > 201* 128*   ALBUMIN  --   --   --   --   --   --   --   --   --  3.4*   PROTTOTAL  --   --   --   --   --   --   --   --   --  7.5   BILITOTAL  --   --   --   --   --   --   --   --   --  0.7   ALKPHOS  --   --   --   --   --   --   --   --   --  73   ALT  --   --   --   --   --   --   --   --   --  35   AST  --   --   --   --   --   --   --   --   --  14   LIPASE  --   --   --   --   --   --   --   --   --  11*    < > = values in this interval not displayed.       Recent Results (from the past 24 hour(s))   CT Chest Pulmonary Embolism w Contrast    Narrative    CT CHEST PULMONARY EMBOLISM WITH CONTRAST 6/22/2023 1:22 PM    CLINICAL HISTORY: Chest pain. Persistent sinus tachycardia, recent  travel and surgery (POD #1).    TECHNIQUE: CT angiogram chest during arterial phase injection IV  contrast. 2D and 3D MIP reconstructions were performed by the CT  technologist. Dose reduction techniques were used.      CONTRAST:  83 mL isovue 370    COMPARISON: None.    FINDINGS:  ANGIOGRAM CHEST: Pulmonary arteries are normal caliber and negative  for pulmonary emboli. Thoracic aorta is negative for dissection. No CT  evidence of right heart strain.    LUNGS AND PLEURA: Minimal dependent compressive atelectasis vs. less  likely infiltrate. Trace pleural fluid bilaterally.    MEDIASTINUM/AXILLAE: No adenopathy or aneurysm.    CORONARY ARTERY CALCIFICATIONS: None.    UPPER ABDOMEN: Hepatic steatosis.    MUSCULOSKELETAL: No frankly destructive bony lesions.      Impression    IMPRESSION:  1.  No pulmonary embolism demonstrated.  2.  Minimal probable dependent atelectasis vs. less likely infiltrate  and trace pleural fluid.    BENEDICTO GAMEZ MD         SYSTEM ID:  [id number]

## 2023-06-23 NOTE — CONSULTS
Clinical Nutrition Brief Note    Received Provider Order - obesity, prediabetes  Chart reviewed, patient has NGT to LIS and is POD 3 exploratory laparoscopy converted to laparotomy with appendectomy, abdominal lavage, and drain placement  Nutrition interventions and teachings planned when NGT removed and pt can safely take PO   Will complete assessment p/t discharge as able     Pao Jennings, JUAN JOSE, LD  Clinical Dietitian   [facility]  Pager [phone]     Complex Repair And Ftsg Text: The defect edges were debeveled with a #15 scalpel blade.  The primary defect was closed partially with a complex linear closure.  Given the location of the defect, shape of the defect and the proximity to free margins a full thickness skin graft was deemed most appropriate to repair the remaining defect.  The graft was trimmed to fit the size of the remaining defect.  The graft was then placed in the primary defect, oriented appropriately, and sutured into place.

## 2023-06-23 NOTE — PROGRESS NOTES
General surgery note    Feeling better, borborygmus positive, passing flatus.  Heart rate improving  Still has bilious NG output.  OUSMANE drains with serous drainage  Abdomen benign  White blood cell count normalizing    Start clear liquid diet while clamping NG tube 6 out of every 8 hours cycles.  Continue antibiotics  Continue Kirkland for accurate I&O measurement.  We will await diet tolerance and bowel function prior to OUSMANE removal.

## 2023-06-23 NOTE — PROGRESS NOTES
General Surgery Progress Note    Admission Date: 6/20/2023  Today's Date: 6/23/2023         Assessment:      Tej Chatman is a 27 year old male who presented with intra-abdominal free air, now s/p exploratory laparoscopy converted to laparotomy with appendectomy, abdominal lavage, and drain placement. No definitive source of perforation identified. Moderate amount of cloudy fluid throughout abdomen. POD 2.    Patient remains afebrile. Tachycardia and leukocytosis improving.         Plan:   - Await return of bowel function prior to NG clamping / removal. Continue to LIS for now, OK for sips of water and ice chips for comfort  - Zosyn q6hrs, recheck CBC tomorrow. WBC peaked at 28.8 two days ago, now at 15.4  - Monitor vitals, tachycardia improving as expected. Most recent blood pressure reading 181/104. Recheck, if remains elevated give one dose of hydralazine.  - On Lovenox BID for DVT ppx, PCDs while resting   - Dilaudid PCA, work on spreading out dilaudid dosing  - Kirkland out today? Appreciate hospitalist input  - Blood sugars improving, sliding scale available  - Social work consulted for discharge planning, primary care follow-up, assistance with finances / lack of insurance  - Consider PT/OT consult in the next 1-2 days pending patient improvement and mobility  - Medical issues being managed by hospitalist. Nicotine patch ordered yesterday.     Dispo: 2-3 more days in hospital for monitoring and recovery        Interval History:   Afebrile, pulse improving - low 100s-110s this morning. Intermittently needing some O2 when talking or up moving. Tej continues to feel better. Abdominal pain is improving, no nausea. NG tube slipped out a little due to loose tape, he advanced it back to its original location. Tej was up moving a couple times yesterday, plans to walk more today.           Physical Exam:   BP (!) 181/104 (BP Location: Left arm)   Pulse 108   Temp 98.5  F (36.9  C) (Oral)   Resp 18   Ht  "2.032 m (6' 8\")   Wt (!) 181.4 kg (400 lb)   SpO2 94%   BMI 43.94 kg/m    I/O last 3 completed shifts:  In: 754 [I.V.:754]  Out: 2825 [Urine:1650; Emesis/NG output:1100; Drains:75]  General: NAD, pleasant, alert and oriented x3. Laying comfortably in bed. NG in place with thin dark bilious output  Respiratory: non-labored breathing  Abdomen: slightly distended but overall soft with mild appropriate tenderness throughout. Incisions are intact. Dressings removed, staples in place without erythema or drainage. Drains with serous fluid  Extremities: scant symmetric bilateral lower extremity edema, no calf tenderness    LABS:  Recent Labs   Lab Test 06/23/23  0737 06/22/23  0657 06/21/23  0820   WBC 15.4* 26.8* 28.8*   HGB 11.6* 13.8 15.9   MCV 91 90 87    320 489*      Recent Labs   Lab Test 06/23/23  0737 06/22/23  0657 06/21/23  0633   POTASSIUM 4.1 4.9 4.2   CHLORIDE 104 104 101   CO2 24 20* 19*   BUN 6.2 9.7 8.5   CR 0.76 0.96 0.83  0.82   ANIONGAP 10 14 16*       -------------------------------    Linh Watt PA-C  [facility]  [phone]      "

## 2023-06-24 LAB
ERYTHROCYTE [DISTWIDTH] IN BLOOD BY AUTOMATED COUNT: 14 % (ref 10–15)
GLUCOSE BLDC GLUCOMTR-MCNC: 106 MG/DL (ref 70–99)
GLUCOSE BLDC GLUCOMTR-MCNC: 99 MG/DL (ref 70–99)
HCT VFR BLD AUTO: 35.1 % (ref 40–53)
HGB BLD-MCNC: 11.1 G/DL (ref 13.3–17.7)
MCH RBC QN AUTO: 27.8 PG (ref 26.5–33)
MCHC RBC AUTO-ENTMCNC: 31.6 G/DL (ref 31.5–36.5)
MCV RBC AUTO: 88 FL (ref 78–100)
PLATELET # BLD AUTO: 415 10E3/UL (ref 150–450)
RBC # BLD AUTO: 3.99 10E6/UL (ref 4.4–5.9)
WBC # BLD AUTO: 12.4 10E3/UL (ref 4–11)

## 2023-06-24 PROCEDURE — 85014 HEMATOCRIT: CPT | Performed by: PHYSICIAN ASSISTANT

## 2023-06-24 PROCEDURE — 120N000001 HC R&B MED SURG/OB

## 2023-06-24 PROCEDURE — 250N000011 HC RX IP 250 OP 636: Mod: JZ | Performed by: PHYSICIAN ASSISTANT

## 2023-06-24 PROCEDURE — 258N000003 HC RX IP 258 OP 636: Performed by: PHYSICIAN ASSISTANT

## 2023-06-24 PROCEDURE — 99232 SBSQ HOSP IP/OBS MODERATE 35: CPT | Performed by: HOSPITALIST

## 2023-06-24 PROCEDURE — 250N000013 HC RX MED GY IP 250 OP 250 PS 637: Performed by: PHYSICIAN ASSISTANT

## 2023-06-24 PROCEDURE — 36415 COLL VENOUS BLD VENIPUNCTURE: CPT | Performed by: PHYSICIAN ASSISTANT

## 2023-06-24 RX ORDER — BISACODYL 10 MG
10 SUPPOSITORY, RECTAL RECTAL ONCE
Status: COMPLETED | OUTPATIENT
Start: 2023-06-24 | End: 2023-06-24

## 2023-06-24 RX ORDER — SIMETHICONE 40MG/0.6ML
40 SUSPENSION, DROPS(FINAL DOSAGE FORM)(ML) ORAL 4 TIMES DAILY
Status: DISCONTINUED | OUTPATIENT
Start: 2023-06-24 | End: 2023-06-27

## 2023-06-24 RX ADMIN — SIMETHICONE 40 MG: 20 EMULSION ORAL at 22:08

## 2023-06-24 RX ADMIN — BISACODYL 10 MG: 10 SUPPOSITORY RECTAL at 10:34

## 2023-06-24 RX ADMIN — ACETAMINOPHEN 650 MG: 325 TABLET ORAL at 23:09

## 2023-06-24 RX ADMIN — FAMOTIDINE 20 MG: 10 INJECTION INTRAVENOUS at 08:44

## 2023-06-24 RX ADMIN — PIPERACILLIN AND TAZOBACTAM 3.38 G: 3; .375 INJECTION, POWDER, FOR SOLUTION INTRAVENOUS at 19:43

## 2023-06-24 RX ADMIN — PIPERACILLIN AND TAZOBACTAM 3.38 G: 3; .375 INJECTION, POWDER, FOR SOLUTION INTRAVENOUS at 13:05

## 2023-06-24 RX ADMIN — POTASSIUM CHLORIDE, DEXTROSE MONOHYDRATE AND SODIUM CHLORIDE: 150; 5; 450 INJECTION, SOLUTION INTRAVENOUS at 01:52

## 2023-06-24 RX ADMIN — ENOXAPARIN SODIUM 40 MG: 40 INJECTION SUBCUTANEOUS at 08:44

## 2023-06-24 RX ADMIN — PIPERACILLIN AND TAZOBACTAM 3.38 G: 3; .375 INJECTION, POWDER, FOR SOLUTION INTRAVENOUS at 02:25

## 2023-06-24 RX ADMIN — ACETAMINOPHEN 650 MG: 325 TABLET ORAL at 00:09

## 2023-06-24 RX ADMIN — ACETAMINOPHEN 650 MG: 325 TABLET ORAL at 16:00

## 2023-06-24 RX ADMIN — ENOXAPARIN SODIUM 40 MG: 40 INJECTION SUBCUTANEOUS at 19:42

## 2023-06-24 RX ADMIN — PIPERACILLIN AND TAZOBACTAM 3.38 G: 3; .375 INJECTION, POWDER, FOR SOLUTION INTRAVENOUS at 08:44

## 2023-06-24 RX ADMIN — HYDRALAZINE HYDROCHLORIDE 10 MG: 20 INJECTION INTRAMUSCULAR; INTRAVENOUS at 16:00

## 2023-06-24 RX ADMIN — SIMETHICONE 40 MG: 20 EMULSION ORAL at 10:34

## 2023-06-24 RX ADMIN — SIMETHICONE 40 MG: 20 EMULSION ORAL at 17:27

## 2023-06-24 RX ADMIN — Medication: at 06:44

## 2023-06-24 RX ADMIN — FAMOTIDINE 20 MG: 20 TABLET ORAL at 19:43

## 2023-06-24 RX ADMIN — POTASSIUM CHLORIDE, DEXTROSE MONOHYDRATE AND SODIUM CHLORIDE: 150; 5; 450 INJECTION, SOLUTION INTRAVENOUS at 13:05

## 2023-06-24 RX ADMIN — SIMETHICONE 40 MG: 20 EMULSION ORAL at 13:05

## 2023-06-24 ASSESSMENT — ACTIVITIES OF DAILY LIVING (ADL)
ADLS_ACUITY_SCORE: 28

## 2023-06-24 NOTE — PROGRESS NOTES
[id number]  POD#1 exploratory laparoscopy converted to open with appendectomy. Patient AOX4. VSS on RA. Pt ambulating assist of 1. Denies N/V.Pain managed with PCA pump .NG clamp 6hrs/2hrs suction, green output.Incision/lap sites CDI. OUSMANE drains LUQ adequate ouput, serous/LLQ minimal ouput, serous.Voiding in alexandre, cloudy/luke output. BS audible/No BM on shift. Diet Clear liq. Continue to monitor. Plan for discharge pending.

## 2023-06-24 NOTE — PROGRESS NOTES
General Surgery Progress Note    Admission Date: 6/20/2023  Today's Date: 6/24/2023         Assessment:      Tej Chatman is a 27 year old male who presented with intra-abdominal free air, now s/p exploratory laparoscopy converted to laparotomy with appendectomy, abdominal lavage, and drain placement. No definitive source of perforation identified. Moderate amount of cloudy fluid throughout abdomen. POD 3.     Tachycardia and leukocytosis improving. Tolerated NG clamping and clear liquids yesterday         Plan:   - Clamp NG again this morning for 3-4 hours. If no nausea with clamping / clear liquids, can remove NG tube.   - Clear liquids only, go slowly. Await return of bowel function  - Give dulcolax suppository this morning for stimulation. Add simethicone for gas pain/cramping  - Zosyn q6hrs, recheck CBC tomorrow. WBC peaked at 28.8 three days ago, now at 12.4  - Monitor vitals, tachycardia improving as expected  - On Lovenox BID for DVT ppx, PCDs while resting   - Dilaudid PCA, work on spreading out dilaudid dosing  - Slight hematuria noted this morning, possibly due to pelvic infection/irritation + alexandre placement. Continue to monitor. Keep alexandre in place  - Blood sugars improving, sliding scale available  - Social work consulted for discharge planning, primary care follow-up, assistance with finances / lack of insurance  - Medical issues being managed by hospitalist, greatly appreciate input      Dispo: possible discharge in 2-3 days pending return of bowel function, diet advancement, overall clinical improvement        Interval History:   One temp reading of 101 overnight, per patient he was hot and covered in a lot of blankets. Otherwise afebrile. Tachycardia improving, hypertension improved. No O2 requirement. Tej continues to overall feel better. He went for a few walks yesterday. No flatus or BM yet, but he feels a lot of movement within his abdomen; this is causing some more cramping pain at  "times. No real nausea with NG clamping and clear liquids yesterday.           Physical Exam:   /78   Pulse 109   Temp 98.7  F (37.1  C) (Oral)   Resp 16   Ht 2.032 m (6' 8\")   Wt (!) 181.4 kg (400 lb)   SpO2 92%   BMI 43.94 kg/m    I/O last 3 completed shifts:  In: 53 [I.V.:53]  Out: 1275 [Urine:750; Emesis/NG output:350; Drains:175]  General: NAD, pleasant, alert and oriented x3. Laying comfortably in bed. NG in place with thin dark bilious output  Respiratory: non-labored breathing  Abdomen: slightly distended but overall soft with mild appropriate tenderness throughout. Staples in place without erythema or drainage. Drains with serous fluid  Extremities: scant symmetric bilateral lower extremity edema, no calf tenderness    LABS:  Recent Labs   Lab Test 06/24/23  0807 06/23/23  0737 06/22/23  0657   WBC 12.4* 15.4* 26.8*   HGB 11.1* 11.6* 13.8   MCV 88 91 90    390 320      Recent Labs   Lab Test 06/23/23  0737 06/22/23  0657 06/21/23  0633   POTASSIUM 4.1 4.9 4.2   CHLORIDE 104 104 101   CO2 24 20* 19*   BUN 6.2 9.7 8.5   CR 0.76 0.96 0.83  0.82   ANIONGAP 10 14 16*      Recent Labs   Lab Test 06/20/23 1919 06/13/23  2327   ALBUMIN 3.4* 3.8   BILITOTAL 0.7 0.9   ALT 35 47   AST 14 29   ALKPHOS 73 74      Recent Labs   Lab Test 06/20/23 1919 06/13/23  2327   LIPASE 11* 16     No lab results found.    -------------------------------    JOSEPH MarinelliC  [facility]  [phone]      "

## 2023-06-24 NOTE — PROVIDER NOTIFICATION
"Per Linh Watt's PA-GAVINO note on 6/24 \"Keep alexandre in place. Hematuria noted this morning, continue to monitor.\"    Did provider choose to remove indwelling alexandre catheter? No    Provider's alexandre indication for keeping indwelling alexandre catheter: Gross Hematuria.    Is there an order for indwelling alexandre catheter? Yes    *If there is a plan to keep alexandre catheter in place at discharge daily notification with provider is not necessary, but please add a notation in the treatment team sticky note that the patient will be discharging with the catheter.   "

## 2023-06-24 NOTE — PROGRESS NOTES
Hospitalist       Provider Role Specialty From [facility]    Hospitalist Progress Note    Date of Service (when I saw the patient): 06/24/2023    Assessment & Plan   Tej Chatman is a 27 year old male admitted on 6/20/2023.     Past medical history significant for Morbid obesity who was admitted 6/20/23 due to suspected perforated bowel.       Patient presented to the ED with abdominal pain, nausea, vomiting and diarrhea that had been ongoing since 6/14.  Abd/pelvis CT with contrast revealed multiple dilated loops of predominately jejunum seen in the mid and lower abdomen with mural thickening, mesenteric stranding and small-moderate volume pneumoperitoneum (findings most pronounced in the mid abdomen and concerning for perforated viscus although exact site is unclear but favored to arise from small bowel), mesenteric free fluid and stranding present w/o evidence of definitive drainable abscess and hepatic steatosis present.       S/P laparoscopic abdominal exploration converted to open with appendectomy, debridement of small bowel mesenteric abscess and lysis of adhesions.        Hospitalist consult has been requested on POD #1 medical management. .       Perforated bowel  Small bowel mesenteric abscess involving mid small bowel, appendix and omentum   S/p laparoscopic abdominal exploration converted to open with appendectomy, debridement of small bowel mesenteric abscess and lysis of adhesions  Afebrile. Wbc count trending down. On zosyn.  - Plan per surgery.        Tachycardia.  Has several reasons to be tachy including stress (surgery, hospitalization), infection, pain.   EKG with sinus tachycardia.    CT chest w/o PE.  Improved tachy.  - Monitor on telemetry.     - Hydartion.     Severe obesity   - Will need to establish care with a PCP.    - Monitor O2 saturations.    - Marley benefit from outpatient sleep study to assess for LUBNA (smores)..        Hyperglycemia  Prediabetes  HGB A1c checked ant at 5.7%.  Patient received IV dexamethasone intra-op and could contribute to hyperglycemia.    - Low intensity sliding scale insulin initiated.    - Nutrition consult.    - SW/CM consult requested to assist with establishing patient with PCP.       Hypocalcemia  Could be secondary to aggressive fluids.    - Order ionized calcium level.    - BMP monitoring.     Alcohol use  Patient indicated that he binge drinks.  He does not consume alcohol on a daily basis.  His last drink was Saturday night.    - Monitor.       Tobacco/nicotine use D/O  Patient reported that he vapes quite heavily throughout the day.    - Nicoderm patch.          DVT Prophylaxis: Defer to primary service.    Disp: per primary team.  Needs PCP.      Medicine will continue to follow with you.        Ok Maradiaga MD    Interval History   Mild abd pain. No sob. No cp.      Physical Exam   Temp: 98.7  F (37.1  C) Temp src: Oral BP: 138/78 Pulse: 109   Resp: 16 SpO2: 92 % O2 Device: None (Room air)    Vitals:    06/20/23 1915   Weight: (!) 181.4 kg (400 lb)     Vital Signs with Ranges  Temp:  [98.7  F (37.1  C)-101  F (38.3  C)] 98.7  F (37.1  C)  Pulse:  [108-112] 109  Resp:  [16-18] 16  BP: (114-138)/(69-78) 138/78  SpO2:  [91 %-92 %] 92 %  I/O last 3 completed shifts:  In: 53 [I.V.:53]  Out: 1275 [Urine:750; Emesis/NG output:350; Drains:175]    Constitutional: Appears comfortable. Obese.  Respiratory: CTAB.  Cardiovascular: RRR.  GI: post-surgical clips. Drain in place.  Skin/Integumen: no peripheral edema.  Other:      Medications     dextrose 5% and 0.45% NaCl + KCl 20 mEq/L 150 mL/hr at 06/24/23 0152     HYDROmorphone         enoxaparin ANTICOAGULANT  40 mg Subcutaneous Q12H     famotidine  20 mg Oral BID    Or     famotidine  20 mg Intravenous BID     insulin aspart  1-4 Units Subcutaneous Q4H     nicotine  1 patch Transdermal Daily     nicotine   Transdermal Q8H     piperacillin-tazobactam   3.375 g Intravenous Q6H     simethicone  40 mg Oral 4x Daily     sodium chloride (PF)  3 mL Intracatheter Q8H       Data   Recent Labs   Lab 06/24/23  0807 06/24/23  0615 06/23/23  2339 06/23/23  1621 06/23/23  1046 06/23/23  0737 06/22/23  0756 06/22/23  0657 06/21/23  0746 06/21/23  0633 06/20/23  1919   WBC 12.4*  --   --   --   --  15.4*  --  26.8*   < >  --  19.8*   HGB 11.1*  --   --   --   --  11.6*  --  13.8   < >  --  15.1   MCV 88  --   --   --   --  91  --  90   < >  --  85     --   --   --   --  390  --  320   < >  --  406   NA  --   --   --   --   --  138  --  138  --  136 134*   POTASSIUM  --   --   --   --   --  4.1  --  4.9  --  4.2 3.9   CHLORIDE  --   --   --   --   --  104  --  104  --  101 97*   CO2  --   --   --   --   --  24  --  20*  --  19* 23   BUN  --   --   --   --   --  6.2  --  9.7  --  8.5 8.8   CR  --   --   --   --   --  0.76  --  0.96  --  0.83  0.82 0.94   ANIONGAP  --   --   --   --   --  10  --  14  --  16* 14   TEMO  --   --   --   --   --  7.7*  --  7.6*  --  7.9* 9.0   GLC  --  106* 121* 118*   < > 146*   < > 154*   < > 201* 128*   ALBUMIN  --   --   --   --   --   --   --   --   --   --  3.4*   PROTTOTAL  --   --   --   --   --   --   --   --   --   --  7.5   BILITOTAL  --   --   --   --   --   --   --   --   --   --  0.7   ALKPHOS  --   --   --   --   --   --   --   --   --   --  73   ALT  --   --   --   --   --   --   --   --   --   --  35   AST  --   --   --   --   --   --   --   --   --   --  14   LIPASE  --   --   --   --   --   --   --   --   --   --  11*    < > = values in this interval not displayed.       No results found for this or any previous visit (from the past 24 hour(s)).

## 2023-06-24 NOTE — PLAN OF CARE
Goal Outcome Evaluation:      Plan of Care Reviewed With: patient, significant other    NG discontinued this shift, pt denies nausea/vomit. Pain managed with PCA. OUSMANE with serous output. Bisacodyl suppository given, passing gas but no BM. Up with Ax1  Problem: Plan of Care - These are the overarching goals to be used throughout the patient stay.    Goal: Optimal Comfort and Wellbeing  Outcome: Met     Problem: Pain Acute  Goal: Optimal Pain Control and Function  Outcome: Met  Intervention: Prevent or Manage Pain  Recent Flowsheet Documentation  Taken 6/24/2023 0900 by Monse Sun, RN  Medication Review/Management: medications reviewed

## 2023-06-25 LAB
BASOPHILS # BLD AUTO: 0 10E3/UL (ref 0–0.2)
BASOPHILS NFR BLD AUTO: 0 %
CA-I BLD-MCNC: 4.3 MG/DL (ref 4.4–5.2)
EOSINOPHIL # BLD AUTO: 0.1 10E3/UL (ref 0–0.7)
EOSINOPHIL NFR BLD AUTO: 1 %
ERYTHROCYTE [DISTWIDTH] IN BLOOD BY AUTOMATED COUNT: 13.8 % (ref 10–15)
GLUCOSE BLDC GLUCOMTR-MCNC: 105 MG/DL (ref 70–99)
GLUCOSE BLDC GLUCOMTR-MCNC: 111 MG/DL (ref 70–99)
GLUCOSE BLDC GLUCOMTR-MCNC: 122 MG/DL (ref 70–99)
GLUCOSE BLDC GLUCOMTR-MCNC: 122 MG/DL (ref 70–99)
GLUCOSE BLDC GLUCOMTR-MCNC: 130 MG/DL (ref 70–99)
GLUCOSE BLDC GLUCOMTR-MCNC: 143 MG/DL (ref 70–99)
HCT VFR BLD AUTO: 36.2 % (ref 40–53)
HGB BLD-MCNC: 11.7 G/DL (ref 13.3–17.7)
IMM GRANULOCYTES # BLD: 0.2 10E3/UL
IMM GRANULOCYTES NFR BLD: 1 %
LYMPHOCYTES # BLD AUTO: 1.5 10E3/UL (ref 0.8–5.3)
LYMPHOCYTES NFR BLD AUTO: 10 %
MCH RBC QN AUTO: 28.1 PG (ref 26.5–33)
MCHC RBC AUTO-ENTMCNC: 32.3 G/DL (ref 31.5–36.5)
MCV RBC AUTO: 87 FL (ref 78–100)
MONOCYTES # BLD AUTO: 1.7 10E3/UL (ref 0–1.3)
MONOCYTES NFR BLD AUTO: 12 %
NEUTROPHILS # BLD AUTO: 11.4 10E3/UL (ref 1.6–8.3)
NEUTROPHILS NFR BLD AUTO: 76 %
NRBC # BLD AUTO: 0 10E3/UL
NRBC BLD AUTO-RTO: 0 /100
PLATELET # BLD AUTO: 491 10E3/UL (ref 150–450)
RBC # BLD AUTO: 4.16 10E6/UL (ref 4.4–5.9)
WBC # BLD AUTO: 14.9 10E3/UL (ref 4–11)

## 2023-06-25 PROCEDURE — 250N000013 HC RX MED GY IP 250 OP 250 PS 637: Performed by: PHYSICIAN ASSISTANT

## 2023-06-25 PROCEDURE — 85025 COMPLETE CBC W/AUTO DIFF WBC: CPT | Performed by: PHYSICIAN ASSISTANT

## 2023-06-25 PROCEDURE — 258N000003 HC RX IP 258 OP 636: Performed by: PHYSICIAN ASSISTANT

## 2023-06-25 PROCEDURE — 82330 ASSAY OF CALCIUM: CPT | Performed by: HOSPITALIST

## 2023-06-25 PROCEDURE — 99232 SBSQ HOSP IP/OBS MODERATE 35: CPT | Performed by: HOSPITALIST

## 2023-06-25 PROCEDURE — 36415 COLL VENOUS BLD VENIPUNCTURE: CPT | Performed by: HOSPITALIST

## 2023-06-25 PROCEDURE — 250N000011 HC RX IP 250 OP 636: Mod: JZ | Performed by: PHYSICIAN ASSISTANT

## 2023-06-25 PROCEDURE — 36415 COLL VENOUS BLD VENIPUNCTURE: CPT | Performed by: PHYSICIAN ASSISTANT

## 2023-06-25 PROCEDURE — 120N000001 HC R&B MED SURG/OB

## 2023-06-25 RX ORDER — SENNOSIDES 8.6 MG
8.6 TABLET ORAL 2 TIMES DAILY
Status: DISCONTINUED | OUTPATIENT
Start: 2023-06-25 | End: 2023-06-27

## 2023-06-25 RX ORDER — CYCLOBENZAPRINE HCL 10 MG
10 TABLET ORAL 3 TIMES DAILY
Status: DISCONTINUED | OUTPATIENT
Start: 2023-06-25 | End: 2023-06-29 | Stop reason: HOSPADM

## 2023-06-25 RX ORDER — ACETAMINOPHEN 325 MG/1
975 TABLET ORAL EVERY 6 HOURS
Status: DISCONTINUED | OUTPATIENT
Start: 2023-06-25 | End: 2023-06-27

## 2023-06-25 RX ORDER — OXYCODONE HYDROCHLORIDE 5 MG/1
5-10 TABLET ORAL EVERY 4 HOURS PRN
Status: DISCONTINUED | OUTPATIENT
Start: 2023-06-25 | End: 2023-06-29 | Stop reason: HOSPADM

## 2023-06-25 RX ORDER — ACETAMINOPHEN 650 MG/1
650 SUPPOSITORY RECTAL EVERY 6 HOURS
Status: DISCONTINUED | OUTPATIENT
Start: 2023-06-25 | End: 2023-06-27

## 2023-06-25 RX ADMIN — FAMOTIDINE 20 MG: 20 TABLET ORAL at 19:55

## 2023-06-25 RX ADMIN — ACETAMINOPHEN 975 MG: 325 TABLET ORAL at 10:16

## 2023-06-25 RX ADMIN — POTASSIUM CHLORIDE, DEXTROSE MONOHYDRATE AND SODIUM CHLORIDE: 150; 5; 450 INJECTION, SOLUTION INTRAVENOUS at 16:02

## 2023-06-25 RX ADMIN — SIMETHICONE 40 MG: 20 EMULSION ORAL at 09:29

## 2023-06-25 RX ADMIN — PIPERACILLIN AND TAZOBACTAM 3.38 G: 3; .375 INJECTION, POWDER, FOR SOLUTION INTRAVENOUS at 01:46

## 2023-06-25 RX ADMIN — POTASSIUM CHLORIDE, DEXTROSE MONOHYDRATE AND SODIUM CHLORIDE: 150; 5; 450 INJECTION, SOLUTION INTRAVENOUS at 00:57

## 2023-06-25 RX ADMIN — POTASSIUM CHLORIDE, DEXTROSE MONOHYDRATE AND SODIUM CHLORIDE: 150; 5; 450 INJECTION, SOLUTION INTRAVENOUS at 08:01

## 2023-06-25 RX ADMIN — FAMOTIDINE 20 MG: 20 TABLET ORAL at 09:29

## 2023-06-25 RX ADMIN — ACETAMINOPHEN 975 MG: 325 TABLET ORAL at 22:14

## 2023-06-25 RX ADMIN — ENOXAPARIN SODIUM 40 MG: 40 INJECTION SUBCUTANEOUS at 19:55

## 2023-06-25 RX ADMIN — CYCLOBENZAPRINE 10 MG: 10 TABLET, FILM COATED ORAL at 10:16

## 2023-06-25 RX ADMIN — ENOXAPARIN SODIUM 40 MG: 40 INJECTION SUBCUTANEOUS at 09:30

## 2023-06-25 RX ADMIN — PIPERACILLIN AND TAZOBACTAM 3.38 G: 3; .375 INJECTION, POWDER, FOR SOLUTION INTRAVENOUS at 08:27

## 2023-06-25 RX ADMIN — ACETAMINOPHEN 975 MG: 325 TABLET ORAL at 16:02

## 2023-06-25 RX ADMIN — SIMETHICONE 40 MG: 20 EMULSION ORAL at 18:39

## 2023-06-25 RX ADMIN — SENNOSIDES 8.6 MG: 8.6 TABLET, FILM COATED ORAL at 10:16

## 2023-06-25 RX ADMIN — CYCLOBENZAPRINE 10 MG: 10 TABLET, FILM COATED ORAL at 16:02

## 2023-06-25 RX ADMIN — PIPERACILLIN AND TAZOBACTAM 3.38 G: 3; .375 INJECTION, POWDER, FOR SOLUTION INTRAVENOUS at 13:36

## 2023-06-25 RX ADMIN — SENNOSIDES 8.6 MG: 8.6 TABLET, FILM COATED ORAL at 22:14

## 2023-06-25 RX ADMIN — SIMETHICONE 40 MG: 20 EMULSION ORAL at 22:14

## 2023-06-25 RX ADMIN — SIMETHICONE 40 MG: 20 EMULSION ORAL at 13:35

## 2023-06-25 RX ADMIN — POTASSIUM CHLORIDE, DEXTROSE MONOHYDRATE AND SODIUM CHLORIDE: 150; 5; 450 INJECTION, SOLUTION INTRAVENOUS at 23:01

## 2023-06-25 RX ADMIN — PIPERACILLIN AND TAZOBACTAM 3.38 G: 3; .375 INJECTION, POWDER, FOR SOLUTION INTRAVENOUS at 19:55

## 2023-06-25 RX ADMIN — CYCLOBENZAPRINE 10 MG: 10 TABLET, FILM COATED ORAL at 22:14

## 2023-06-25 ASSESSMENT — ACTIVITIES OF DAILY LIVING (ADL)
ADLS_ACUITY_SCORE: 28

## 2023-06-25 NOTE — PROGRESS NOTES
Reason for admission: Intra-abdominal free air and free fluid with small bowel dilatation  Surgical Procedure/s: Exploratory Laparoscopy converted to open laparotomy; Appendectomy; Abdominal Lavage; Drain placement  POD#: 4    Mental Status: x4  Activity: SBA  Diet: FLD, tolerating  Pain: Controlled  Urination: Voiding adequately without difficulty  Tele/Restraints/Iso: Sinus Tachy  LDA: PIV infusing w/ D5 1/2NS +20meqKCl at 150ml/hr. Continuous PCA 0.2 mg/ml. OUSMANE drains serous in appearance, stripped q4.  Expected D/C Date: Pending clinical improvement  Other Info:  Per surgery Advance diet as tolerated to low fiber. BM today. Tachycardic. On IV abx. BG check. Hx of significant morbid obesity. Incision GENARO, CDI.

## 2023-06-25 NOTE — PROGRESS NOTES
"General Surgery Progress Note    Admission Date: 6/20/2023  Today's Date: 6/24/2023         Assessment:      Tej Chatman is a 27 year old male who presented with intra-abdominal free air, now s/p exploratory laparoscopy converted to laparotomy with appendectomy, abdominal lavage, and drain placement. No definitive source of perforation identified. Moderate amount of cloudy fluid throughout abdomen. POD 4.         Plan:     - BM+. Advance diet as tolerated to low fiber diet. Senokot BID.  - Dilaudid PCA dose and max decreased. Plan to transition to oral analgesia today. Scheduled tylenol and flexeril, prn oxycodone and simethicone.  - Zosyn q6hrs, recheck CBC today.  - Continue OUSMANE drains. Output coming down, remains serous.  - Monitor fever curve. Tachycardia improving as expected. Encourage aggressive IS.  - On Lovenox BID for DVT ppx, PCDs while resting   - Slight hematuria yesterday, possibly due to pelvic infection/irritation + alexandre placement. Will remove alexandre today.  - Blood sugars improving, sliding scale available  - Social work consulted for discharge planning, primary care follow-up, assistance with finances / lack of insurance  - Medical issues being managed by hospitalist, greatly appreciate input    Dispo: possible discharge in the next couple days pending overall clinical improvement        Interval History:   Tej Chatman is seen on surgical rounds. He states he feels better each day. Tolerated clear liquid diet yesterday. BM yesterday and passing flatus, feels less bloated. Ambulating. Remains hypertensive, tachycardic, and Tmax 101.3 yesterday. Good clear UO.          Physical Exam:   BP (!) 147/88 (BP Location: Left arm)   Pulse 107   Temp (!) 100.6  F (38.1  C) (Oral)   Resp 16   Ht 2.032 m (6' 8\")   Wt (!) 181.4 kg (400 lb)   SpO2 93%   BMI 43.94 kg/m    I/O last 3 completed shifts:  In: 58 [I.V.:58]  Out: 1630 [Urine:1450; Drains:180]  General: NAD, pleasant, alert and oriented " x3. Laying comfortably in bed. NG in place with thin dark bilious output  Respiratory: non-labored breathing  Abdomen: slightly distended but overall soft with mild appropriate tenderness throughout. Staples in place without erythema or drainage. Drains with serous fluid (LLQ 30 ml,  ml)  Extremities: scant symmetric bilateral lower extremity edema, no calf tenderness    LABS:  Recent Labs   Lab Test 06/24/23  0807 06/23/23  0737 06/22/23  0657   WBC 12.4* 15.4* 26.8*   HGB 11.1* 11.6* 13.8   MCV 88 91 90    390 320      Recent Labs   Lab Test 06/23/23  0737 06/22/23  0657 06/21/23  0633   POTASSIUM 4.1 4.9 4.2   CHLORIDE 104 104 101   CO2 24 20* 19*   BUN 6.2 9.7 8.5   CR 0.76 0.96 0.83  0.82   ANIONGAP 10 14 16*      Recent Labs   Lab Test 06/20/23 1919 06/13/23  2327   ALBUMIN 3.4* 3.8   BILITOTAL 0.7 0.9   ALT 35 47   AST 14 29   ALKPHOS 73 74      Recent Labs   Lab Test 06/20/23  1919 06/13/23  2327   LIPASE 11* 16     No lab results found.    -------------------------------    Shoshana Colunga PA-C  [facility]  [phone]

## 2023-06-25 NOTE — PROGRESS NOTES
[id number]  POD#2 exploratory laparoscopy converted to open with appendectomy. Patient AOX4. VSS on RA. Tachycardic a times.Pt ambulating assist of 1. Denies N/V.Pain managed with PCA pump.Incision/lap sites CDI. OUSMANE drains LUQ adequate ouput, serous/LLQ minimal ouput, serous.Voiding in alexandre, cloudy/luke output. BS audible/No BM on shift, passing flatus. Diet Clear liq. Tele NSR.Continue to monitor. Plan for discharge pending.

## 2023-06-25 NOTE — PLAN OF CARE
Up sba. Tolerating clears, advance diet today as tolerated. Pain 2/10. Transition to PO pain meds with PCA [place] removed--due to void. OUSMANE x2. Incision CDI. IVF infusing. Passing flatus, BMP yesterday

## 2023-06-25 NOTE — PROGRESS NOTES
Hospitalist       Provider Role Specialty From [facility]    Hospitalist Progress Note    Date of Service (when I saw the patient): 06/25/2023    Assessment & Plan   Tej Chatman is a 27 year old male admitted on 6/20/2023.     Past medical history significant for Morbid obesity who was admitted 6/20/23 due to suspected perforated bowel.       Patient presented to the ED with abdominal pain, nausea, vomiting and diarrhea that had been ongoing since 6/14.  Abd/pelvis CT with contrast revealed multiple dilated loops of predominately jejunum seen in the mid and lower abdomen with mural thickening, mesenteric stranding and small-moderate volume pneumoperitoneum (findings most pronounced in the mid abdomen and concerning for perforated viscus although exact site is unclear but favored to arise from small bowel), mesenteric free fluid and stranding present w/o evidence of definitive drainable abscess and hepatic steatosis present.       S/P laparoscopic abdominal exploration converted to open with appendectomy, debridement of small bowel mesenteric abscess and lysis of adhesions.        Hospitalist consult has been requested on POD #1 medical management. .       Perforated bowel  Small bowel mesenteric abscess involving mid small bowel, appendix and omentum   S/p laparoscopic abdominal exploration converted to open with appendectomy, debridement of small bowel mesenteric abscess and lysis of adhesions  Afebrile. Wbc count trending down. On zosyn.  BM last night.  - Plan per surgery.        Tachycardia.  Has several reasons to be tachy including stress (surgery, hospitalization), infection, pain.   EKG with sinus tachycardia.    CT chest w/o PE.  Improved tachy.  - Monitor on telemetry.     - Hydartion.     Severe obesity   - Will need to establish care with a PCP.    - Monitor O2 saturations.    - Marley benefit from outpatient sleep study to assess for LUBNA (smores)..        Hyperglycemia  Prediabetes  HGB A1c checked ant at 5.7%.  Patient received IV dexamethasone intra-op and could contribute to hyperglycemia.    - Low intensity sliding scale insulin initiated.    - Nutrition consult.    - SW/CM consult requested to assist with establishing patient with PCP.       Hypocalcemia  Could be secondary to aggressive fluids.    - Order ionized calcium level.    - BMP monitoring.     Alcohol use  Patient indicated that he binge drinks.  He does not consume alcohol on a daily basis.  His last drink was Saturday night.    - Monitor.       Tobacco/nicotine use D/O  Patient reported that he vapes quite heavily throughout the day.    - Nicoderm patch.          DVT Prophylaxis: Defer to primary service.    Disp: per primary team.  Needs PCP.      Medicine will continue to follow with you.        Ok Maradiaga MD    Interval History   abd pain is controlled. Had BM last night. Passing gas.  No sob. No cp.      Physical Exam   Temp: (!) 100.6  F (38.1  C) Temp src: Oral BP: (!) 147/88 Pulse: 107   Resp: 16 SpO2: 93 % O2 Device: None (Room air)    Vitals:    06/20/23 1915   Weight: (!) 181.4 kg (400 lb)     Vital Signs with Ranges  Temp:  [99.8  F (37.7  C)-101.3  F (38.5  C)] 100.6  F (38.1  C)  Pulse:  [105-107] 107  Resp:  [16-18] 16  BP: (145-162)/(88-92) 147/88  SpO2:  [93 %-95 %] 93 %  I/O last 3 completed shifts:  In: 58 [I.V.:58]  Out: 1630 [Urine:1450; Drains:180]    Constitutional: Appears comfortable. Obese.  Respiratory: CTAB.  Cardiovascular: RRR.  GI: post-surgical clips. Drain in place.  Skin/Integumen: no peripheral edema.  Other:      Medications     dextrose 5% and 0.45% NaCl + KCl 20 mEq/L 150 mL/hr at 06/25/23 0801     HYDROmorphone         enoxaparin ANTICOAGULANT  40 mg Subcutaneous Q12H     famotidine  20 mg Oral BID    Or     famotidine  20 mg Intravenous BID     insulin aspart  1-4 Units Subcutaneous Q4H     nicotine  1 patch Transdermal Daily     nicotine   Transdermal  Q8H     piperacillin-tazobactam  3.375 g Intravenous Q6H     simethicone  40 mg Oral 4x Daily     sodium chloride (PF)  3 mL Intracatheter Q8H       Data   Recent Labs   Lab 06/25/23  0812 06/25/23  0606 06/25/23  0207 06/24/23  2213 06/24/23  0807 06/23/23  1046 06/23/23  0737 06/22/23  0756 06/22/23  0657 06/21/23  0746 06/21/23  0633 06/20/23  1919   WBC  --   --   --   --  12.4*  --  15.4*  --  26.8*   < >  --  19.8*   HGB  --   --   --   --  11.1*  --  11.6*  --  13.8   < >  --  15.1   MCV  --   --   --   --  88  --  91  --  90   < >  --  85   PLT  --   --   --   --  415  --  390  --  320   < >  --  406   NA  --   --   --   --   --   --  138  --  138  --  136 134*   POTASSIUM  --   --   --   --   --   --  4.1  --  4.9  --  4.2 3.9   CHLORIDE  --   --   --   --   --   --  104  --  104  --  101 97*   CO2  --   --   --   --   --   --  24  --  20*  --  19* 23   BUN  --   --   --   --   --   --  6.2  --  9.7  --  8.5 8.8   CR  --   --   --   --   --   --  0.76  --  0.96  --  0.83  0.82 0.94   ANIONGAP  --   --   --   --   --   --  10  --  14  --  16* 14   TEMO  --   --   --   --   --   --  7.7*  --  7.6*  --  7.9* 9.0   * 143* 111*   < >  --    < > 146*   < > 154*   < > 201* 128*   ALBUMIN  --   --   --   --   --   --   --   --   --   --   --  3.4*   PROTTOTAL  --   --   --   --   --   --   --   --   --   --   --  7.5   BILITOTAL  --   --   --   --   --   --   --   --   --   --   --  0.7   ALKPHOS  --   --   --   --   --   --   --   --   --   --   --  73   ALT  --   --   --   --   --   --   --   --   --   --   --  35   AST  --   --   --   --   --   --   --   --   --   --   --  14   LIPASE  --   --   --   --   --   --   --   --   --   --   --  11*    < > = values in this interval not displayed.       No results found for this or any previous visit (from the past 24 hour(s)).

## 2023-06-26 ENCOUNTER — APPOINTMENT (OUTPATIENT)
Dept: CT IMAGING | Facility: CLINIC | Age: 28
DRG: 853 | End: 2023-06-26
Attending: PHYSICIAN ASSISTANT

## 2023-06-26 ENCOUNTER — HOSPITAL ENCOUNTER (OUTPATIENT)
Facility: CLINIC | Age: 28
DRG: 853 | End: 2023-06-26

## 2023-06-26 LAB
BASOPHILS # BLD AUTO: 0 10E3/UL (ref 0–0.2)
BASOPHILS NFR BLD AUTO: 0 %
EOSINOPHIL # BLD AUTO: 0.1 10E3/UL (ref 0–0.7)
EOSINOPHIL NFR BLD AUTO: 1 %
ERYTHROCYTE [DISTWIDTH] IN BLOOD BY AUTOMATED COUNT: 13.9 % (ref 10–15)
GLUCOSE BLDC GLUCOMTR-MCNC: 107 MG/DL (ref 70–99)
GLUCOSE BLDC GLUCOMTR-MCNC: 111 MG/DL (ref 70–99)
GLUCOSE BLDC GLUCOMTR-MCNC: 120 MG/DL (ref 70–99)
GLUCOSE BLDC GLUCOMTR-MCNC: 132 MG/DL (ref 70–99)
GLUCOSE BLDC GLUCOMTR-MCNC: 133 MG/DL (ref 70–99)
GLUCOSE BLDC GLUCOMTR-MCNC: 134 MG/DL (ref 70–99)
GLUCOSE BLDC GLUCOMTR-MCNC: 96 MG/DL (ref 70–99)
HCT VFR BLD AUTO: 35.9 % (ref 40–53)
HGB BLD-MCNC: 11.8 G/DL (ref 13.3–17.7)
HOLD SPECIMEN: NORMAL
IMM GRANULOCYTES # BLD: 0.2 10E3/UL
IMM GRANULOCYTES NFR BLD: 1 %
LYMPHOCYTES # BLD AUTO: 1.6 10E3/UL (ref 0.8–5.3)
LYMPHOCYTES NFR BLD AUTO: 10 %
MCH RBC QN AUTO: 28.4 PG (ref 26.5–33)
MCHC RBC AUTO-ENTMCNC: 32.9 G/DL (ref 31.5–36.5)
MCV RBC AUTO: 86 FL (ref 78–100)
MONOCYTES # BLD AUTO: 1.7 10E3/UL (ref 0–1.3)
MONOCYTES NFR BLD AUTO: 10 %
NEUTROPHILS # BLD AUTO: 12.5 10E3/UL (ref 1.6–8.3)
NEUTROPHILS NFR BLD AUTO: 78 %
NRBC # BLD AUTO: 0 10E3/UL
NRBC BLD AUTO-RTO: 0 /100
PLATELET # BLD AUTO: 476 10E3/UL (ref 150–450)
RBC # BLD AUTO: 4.16 10E6/UL (ref 4.4–5.9)
WBC # BLD AUTO: 16 10E3/UL (ref 4–11)

## 2023-06-26 PROCEDURE — 250N000011 HC RX IP 250 OP 636: Mod: JZ | Performed by: PHYSICIAN ASSISTANT

## 2023-06-26 PROCEDURE — 36415 COLL VENOUS BLD VENIPUNCTURE: CPT | Performed by: PHYSICIAN ASSISTANT

## 2023-06-26 PROCEDURE — 74177 CT ABD & PELVIS W/CONTRAST: CPT

## 2023-06-26 PROCEDURE — 85025 COMPLETE CBC W/AUTO DIFF WBC: CPT | Performed by: PHYSICIAN ASSISTANT

## 2023-06-26 PROCEDURE — 120N000001 HC R&B MED SURG/OB

## 2023-06-26 PROCEDURE — 87086 URINE CULTURE/COLONY COUNT: CPT | Performed by: PHYSICIAN ASSISTANT

## 2023-06-26 PROCEDURE — 99232 SBSQ HOSP IP/OBS MODERATE 35: CPT | Performed by: INTERNAL MEDICINE

## 2023-06-26 PROCEDURE — 87149 DNA/RNA DIRECT PROBE: CPT | Performed by: SURGERY

## 2023-06-26 PROCEDURE — 250N000011 HC RX IP 250 OP 636: Performed by: PHYSICIAN ASSISTANT

## 2023-06-26 PROCEDURE — 250N000009 HC RX 250: Performed by: PHYSICIAN ASSISTANT

## 2023-06-26 PROCEDURE — 258N000003 HC RX IP 258 OP 636: Performed by: PHYSICIAN ASSISTANT

## 2023-06-26 PROCEDURE — 250N000013 HC RX MED GY IP 250 OP 250 PS 637: Performed by: PHYSICIAN ASSISTANT

## 2023-06-26 PROCEDURE — 36415 COLL VENOUS BLD VENIPUNCTURE: CPT | Performed by: SURGERY

## 2023-06-26 PROCEDURE — 87077 CULTURE AEROBIC IDENTIFY: CPT | Performed by: SURGERY

## 2023-06-26 RX ORDER — DIPHENHYDRAMINE HCL 25 MG
25 CAPSULE ORAL EVERY 6 HOURS PRN
Status: DISCONTINUED | OUTPATIENT
Start: 2023-06-26 | End: 2023-06-29 | Stop reason: HOSPADM

## 2023-06-26 RX ORDER — POLYETHYLENE GLYCOL 3350 17 G/17G
17 POWDER, FOR SOLUTION ORAL DAILY
Status: DISCONTINUED | OUTPATIENT
Start: 2023-06-26 | End: 2023-06-29 | Stop reason: HOSPADM

## 2023-06-26 RX ORDER — DIPHENHYDRAMINE HYDROCHLORIDE 50 MG/ML
25 INJECTION INTRAMUSCULAR; INTRAVENOUS EVERY 6 HOURS PRN
Status: DISCONTINUED | OUTPATIENT
Start: 2023-06-26 | End: 2023-06-29 | Stop reason: HOSPADM

## 2023-06-26 RX ORDER — IOPAMIDOL 755 MG/ML
135 INJECTION, SOLUTION INTRAVASCULAR ONCE
Status: COMPLETED | OUTPATIENT
Start: 2023-06-26 | End: 2023-06-26

## 2023-06-26 RX ORDER — AMOXICILLIN 250 MG
2 CAPSULE ORAL 2 TIMES DAILY
Status: DISCONTINUED | OUTPATIENT
Start: 2023-06-26 | End: 2023-06-29 | Stop reason: HOSPADM

## 2023-06-26 RX ORDER — AMOXICILLIN 250 MG
1 CAPSULE ORAL 2 TIMES DAILY
Status: DISCONTINUED | OUTPATIENT
Start: 2023-06-26 | End: 2023-06-29 | Stop reason: HOSPADM

## 2023-06-26 RX ADMIN — CYCLOBENZAPRINE 10 MG: 10 TABLET, FILM COATED ORAL at 22:11

## 2023-06-26 RX ADMIN — SIMETHICONE 40 MG: 20 EMULSION ORAL at 12:10

## 2023-06-26 RX ADMIN — POTASSIUM CHLORIDE, DEXTROSE MONOHYDRATE AND SODIUM CHLORIDE: 150; 5; 450 INJECTION, SOLUTION INTRAVENOUS at 22:15

## 2023-06-26 RX ADMIN — FAMOTIDINE 20 MG: 20 TABLET ORAL at 09:20

## 2023-06-26 RX ADMIN — POTASSIUM CHLORIDE, DEXTROSE MONOHYDRATE AND SODIUM CHLORIDE: 150; 5; 450 INJECTION, SOLUTION INTRAVENOUS at 14:17

## 2023-06-26 RX ADMIN — ACETAMINOPHEN 975 MG: 325 TABLET ORAL at 12:09

## 2023-06-26 RX ADMIN — ACETAMINOPHEN 975 MG: 325 TABLET ORAL at 18:46

## 2023-06-26 RX ADMIN — SIMETHICONE 40 MG: 20 EMULSION ORAL at 22:11

## 2023-06-26 RX ADMIN — SODIUM CHLORIDE 79 ML: 9 INJECTION, SOLUTION INTRAVENOUS at 11:05

## 2023-06-26 RX ADMIN — ACETAMINOPHEN 975 MG: 325 TABLET ORAL at 22:11

## 2023-06-26 RX ADMIN — PIPERACILLIN AND TAZOBACTAM 3.38 G: 3; .375 INJECTION, POWDER, FOR SOLUTION INTRAVENOUS at 08:34

## 2023-06-26 RX ADMIN — OXYCODONE HYDROCHLORIDE 5 MG: 5 TABLET ORAL at 14:54

## 2023-06-26 RX ADMIN — POTASSIUM CHLORIDE, DEXTROSE MONOHYDRATE AND SODIUM CHLORIDE: 150; 5; 450 INJECTION, SOLUTION INTRAVENOUS at 06:08

## 2023-06-26 RX ADMIN — SENNOSIDES 8.6 MG: 8.6 TABLET, FILM COATED ORAL at 09:20

## 2023-06-26 RX ADMIN — ENOXAPARIN SODIUM 40 MG: 40 INJECTION SUBCUTANEOUS at 09:20

## 2023-06-26 RX ADMIN — PIPERACILLIN AND TAZOBACTAM 3.38 G: 3; .375 INJECTION, POWDER, FOR SOLUTION INTRAVENOUS at 21:04

## 2023-06-26 RX ADMIN — PIPERACILLIN AND TAZOBACTAM 3.38 G: 3; .375 INJECTION, POWDER, FOR SOLUTION INTRAVENOUS at 02:21

## 2023-06-26 RX ADMIN — CYCLOBENZAPRINE 10 MG: 10 TABLET, FILM COATED ORAL at 09:20

## 2023-06-26 RX ADMIN — IOPAMIDOL 135 ML: 755 INJECTION, SOLUTION INTRAVENOUS at 11:04

## 2023-06-26 RX ADMIN — ACETAMINOPHEN 975 MG: 325 TABLET ORAL at 04:47

## 2023-06-26 RX ADMIN — CYCLOBENZAPRINE 10 MG: 10 TABLET, FILM COATED ORAL at 18:47

## 2023-06-26 RX ADMIN — SIMETHICONE 40 MG: 20 EMULSION ORAL at 09:20

## 2023-06-26 RX ADMIN — PIPERACILLIN AND TAZOBACTAM 3.38 G: 3; .375 INJECTION, POWDER, FOR SOLUTION INTRAVENOUS at 14:24

## 2023-06-26 RX ADMIN — SENNOSIDES 8.6 MG: 8.6 TABLET, FILM COATED ORAL at 22:11

## 2023-06-26 RX ADMIN — FAMOTIDINE 20 MG: 20 TABLET ORAL at 22:11

## 2023-06-26 ASSESSMENT — ACTIVITIES OF DAILY LIVING (ADL)
ADLS_ACUITY_SCORE: 28

## 2023-06-26 NOTE — CONSULTS
IR consult received. Will plan on CT guided drain placement tomorrow AM. Please keep NPO after midnight.  Jourdan Noble MD

## 2023-06-26 NOTE — PROGRESS NOTES
"CLINICAL NUTRITION SERVICES  -  ASSESSMENT NOTE      Recommendations Ordered by Registered Dietitian (RD):   Obtain standing scale wt  Given poor intake for the past 1+ weeks, will provide Ensure Enlive BID  If blood sugars begin to increase with the other PO improving, can change to Glucerna    Malnutrition:   % Weight Loss: unable to determine  % Intake:  </= 50% for >/= 5 days (severe malnutrition)  Subcutaneous Fat Loss:  None observed  Muscle Loss:  None observed  Fluid Retention:  None noted    Malnutrition Diagnosis: Unable to determine due to lack of recent wt trending. Does not likely meet criteria at this time          REASON FOR ASSESSMENT  Tej Chatman is a 27 year old male seen by Registered Dietitian for Provider Order - obesity, prediabetes      NUTRITION HISTORY  Chart reviewed and discussed with patient   Recalls last true meal PTA on Saturday 6/17, and since then has had minimal PO intake d/t abdominal pain and diarrhea   Estimates weighing around 400 lbs regularly but feels he may have lost some this past week  Limited diet hx obtained today as patient appears uncomfortable   No known food allergies     CURRENT NUTRITION ORDERS  Diet Order:     Low fiber    Current Intake/Tolerance:  Downgraded to NPO today per surgery with planned CT  Diet advanced to low fiber this morning - pt has otherwise been between NPO/CL/FL since admit 6/21  Appetite has been poor with minimal intake thus far, he continues to endorse abdominal pain (suspects related to gas) and diarrhea but denies nausea or vomiting     POD 5 Exploratory Laparoscopy converted to open laparotomy; Appendectomy; Abdominal Lavage; Drain placement    NUTRITION FOCUSED PHYSICAL ASSESSMENT FOR DIAGNOSING MALNUTRITION)  Yes               Observed:    No nutrition-related physical findings observed    Obtained from Chart/Interdisciplinary Team:  None noted     ANTHROPOMETRICS  Height: 6' 8\"  Weight: 400 lbs 0 oz  Body mass index is 43.94 " kg/m .  Weight Status:  Obesity Grade III BMI >40  IBW: 103 kg +/- 10%  % IBW: 175%  Weight History: No previous wts on file to evaluate   Wt Readings from Last 10 Encounters:   06/20/23 (!) 181.4 kg (400 lb)   06/20/23 (!) 180.1 kg (397 lb)   06/13/23 (!) 182.9 kg (403 lb 3.2 oz)       LABS  Labs reviewed  Recent Labs   Lab 06/26/23  1222 06/26/23  0808 06/26/23  0616 06/26/23  0159 06/25/23  2236 06/25/23  1601   * 111* 132* 134* 130* 105*     Lab Results   Component Value Date    A1C 5.7 06/21/2023       MEDICATIONS  Medications reviewed  Senokot  D5 IVF at 125 mL/hr = 150 grams CHO, 510 kcal    ASSESSED NUTRITION NEEDS PER APPROVED PRACTICE GUIDELINES:    Dosing Weight 121 kg - adjusted   Estimated Energy Needs: 6826-3613 kcals (20-25 Kcal/Kg)  Justification: obese  Estimated Protein Needs: 121-145 grams protein (1-1.2 g pro/Kg)  Justification: preservation of lean body mass  Estimated Fluid Needs: 1 mL/kcal or per MD      MALNUTRITION:  % Weight Loss: unable to determine  % Intake:  </= 50% for >/= 5 days (severe malnutrition)  Subcutaneous Fat Loss:  None observed  Muscle Loss:  None observed  Fluid Retention:  None noted    Malnutrition Diagnosis: Unable to determine due to lack of recent wt trending. Does not likely meet criteria at this time      NUTRITION DIAGNOSIS:  Inadequate oral intake related to decreased appetite and restricted diet order with altered GI fx as evidenced by minimal intakes for the past 8 days       NUTRITION INTERVENTIONS  Recommendations / Nutrition Prescription  ADAT  Ensure BID      Implementation  Nutrition education: Not appropriate at this time due to patient condition. Can follow up with obesity and prediabetes as OP once recovered   Medical Food Supplement: as above      Nutrition Goals  Patient will consume 50% meals and supplements BID       MONITORING AND EVALUATION:  Progress towards goals will be monitored and evaluated per protocol and Practice  Guidelines        Pao Jennings RD, LD  Clinical Dietitian   [facility]  Pager [phone]

## 2023-06-26 NOTE — PROGRESS NOTES
"SPIRITUAL HEALTH SERVICES Progress Note  [facility]  Unit General Surgery    Saw pt Tej JOVAN Chatman per length of stay. Introduced myself and Spiritual Health Services. Pt declined a visit, stating \"No, I'm ok.\" Informed him how to access Spiritual Health Services in the future. Pt expressed no other spiritual health needs.     Spiritual Health Services will follow-up per further pt request or need.     FILIPE SavageDiv  Chaplain Resident   [phone]   "

## 2023-06-26 NOTE — PROGRESS NOTES
General Surgery Progress Note    Admission Date: 6/20/2023  Today's Date: 6/26/2023         Assessment:      Tej Chatman is a 27 year old male who presented with intra-abdominal free air, now s/p exploratory laparoscopy converted to laparotomy with appendectomy, abdominal lavage, and drain placement. No definitive source of perforation identified. Moderate amount of cloudy fluid throughout abdomen. POD 5.    Tachycardia improving, white count increasing the past 2 days. Ongoing low grade fevers despite regular tylenol.          Plan:   - Discussed with Dr. Bruce. Will obtain blood cultures, urine cultures, and repeat CT abdomen/pelvis with contrast today to assess for ongoing source of infection. Discussed with Tej the possibility of developing intra-abdominal abscess given infected fluid throughout abdomen on admission.   - NPO for now with sips of water/ice chips until CT completed. Patient with minimal appetite currently  - Continue bowel regimen, monitor for continued bowel function  - Oral pain medications: currently on scheduled tylenol, flexeril. Simethicone and oxy available prn  - Continue current OUSMANE drains for now, strip every shift. If no intra-abdominal findings on CT, will likely remove surgical drains later today  - Monitor fever curve and vitals, tachycardia still seems to be improving. Encourage aggressive IS use  - Out of bed at least QID, PCDs while resting, Lovenox BID for DVT ppx  - Social work consulted for discharge planning. Hospitalist following for medical issues. Greatly appreciate input    Dispo: unclear, not yet ready for discharge. Anticipate 2 more days in hospital pending improvement        Interval History:   Tmax 100.4 x 24 hours, heart rate in the low 100s at times - 99 this morning. Blood pressures stable, no O2 requirement. Tej feels about the same today. The main change he has noticed is that he feels hot, currently has a cloth on his forehead. He took in some  "liquids yesterday. No nausea, but hasn't been able to make himself take in more due to poor appetite.           Physical Exam:   /85   Pulse 99   Temp 98.2  F (36.8  C) (Oral)   Resp 16   Ht 2.032 m (6' 8\")   Wt (!) 181.4 kg (400 lb)   SpO2 97%   BMI 43.94 kg/m    I/O last 3 completed shifts:  In: 8626 [I.V.:8626]  Out: 2230 [Urine:2100; Drains:130]  General: NAD, pleasant, alert and oriented x3. Resting in bed with blankets over him and cloth on his forehead.   Respiratory: non-labored breathing  Abdomen: slightly distended but overall soft with mild appropriate tenderness throughout. Staples in place without erythema or drainage. Drains with serosanguinous fluid   Extremities: scant symmetric bilateral lower extremity edema, no calf tenderness    LABS:  Recent Labs   Lab Test 06/26/23  0712 06/25/23  2309 06/24/23  0807   WBC 16.0* 14.9* 12.4*   HGB 11.8* 11.7* 11.1*   MCV 86 87 88   * 491* 415      Recent Labs   Lab Test 06/23/23  0737 06/22/23  0657 06/21/23  0633   POTASSIUM 4.1 4.9 4.2   CHLORIDE 104 104 101   CO2 24 20* 19*   BUN 6.2 9.7 8.5   CR 0.76 0.96 0.83  0.82   ANIONGAP 10 14 16*     -------------------------------    Linh Watt PA-C  [facility]  [phone]      "

## 2023-06-26 NOTE — PROVIDER NOTIFICATION
MD Notification    Notified Person: PA    Notified Person Name: Linh Watt    Notification Date/Time: 6/26/2023 1403    Notification Interaction: Mehreen    Purpose of Notification: CT results back, please address    Orders Received: IR consult placed    Comments:

## 2023-06-26 NOTE — PROGRESS NOTES
Hospitalist       Provider Role Specialty From [facility]    Hospitalist Progress Note    Date of Service (when I saw the patient): 06/26/2023    Assessment & Plan   Tej Chatman is a 27 year old male with medical history significant for Morbid obesity who was admitted 6/20/23 due to suspected perforated bowel S/P laparoscopic abdominal exploration converted to open with appendectomy, debridement of small bowel mesenteric abscess and lysis of adhesions.        Hospitalist consult has been requested on POD #1 medical management. .      Perforated bowel  Small bowel mesenteric abscess involving mid small bowel, appendix and omentum   S/p laparoscopic abdominal exploration converted to open with appendectomy, debridement of small bowel mesenteric abscess and lysis of adhesions  New fluid collection on CTAP 6/26 - c/f abscess  Afebrile. WBC trending up. On zosyn (6/21-)  BM last night.  - Plan per surgery.  - IR consulted for drain 6/27; NPOmn     Tachycardia/Low grade fever  Has several reasons to be tachy including stress (surgery, hospitalization), infection, pain.   EKG with sinus tachycardia. Febrile to 100.4  - CT chest w/o PE.  - Monitor on telemetry.     - f/u urine culture, blood culture (ordered by primary service 6/26), repeat CTAP as per above  - c/w Zosyn (6/21-)    Severe obesity   - Will need to establish care with a PCP.    - Monitor O2 saturations.    - Marley benefit from outpatient sleep study to assess for LUBNA (smores)..       Hyperglycemia  Prediabetes  HGB A1c checked ant at 5.7%.  Patient received IV dexamethasone intra-op and could contribute to hyperglycemia.    - Low intensity sliding scale insulin initiated.    - Nutrition consult.    - SW/CM consult requested to assist with establishing patient with PCP.       Hypocalcemia  Could be secondary to aggressive fluids.    - Order ionized calcium level.    - BMP monitoring.     Alcohol use  Patient indicated that  he binge drinks.  He does not consume alcohol on a daily basis.  His last drink was Saturday night.    - Monitor.       Tobacco/nicotine use D/O  Patient reported that he vapes quite heavily throughout the day.    - Nicoderm patch.          DVT Prophylaxis: Defer to primary service.    Disp: per primary team.  Needs PCP.      Medicine will continue to follow with you.    Melania Cartwright MD    Interval History     Doing okay  About to go down to Ct  No NVD, no chest pain    Physical Exam   Temp: 98.2  F (36.8  C) Temp src: Oral BP: 136/85 Pulse: 99   Resp: 16 SpO2: 97 % O2 Device: None (Room air)    Vitals:    06/20/23 1915   Weight: (!) 181.4 kg (400 lb)     Vital Signs with Ranges  Temp:  [98.2  F (36.8  C)-100.4  F (38  C)] 98.2  F (36.8  C)  Pulse:  [] 99  Resp:  [16-18] 16  BP: (136-158)/(85-95) 136/85  SpO2:  [92 %-97 %] 97 %  I/O last 3 completed shifts:  In: 8626 [I.V.:8626]  Out: 2230 [Urine:2100; Drains:130]    Constitutional: Appears comfortable. Obese.  Respiratory: CTAB.  Cardiovascular: RRR.  GI: post-surgical clips. Drain in place.  Skin/Integumen: no peripheral edema.  Other:      Medications     dextrose 5% and 0.45% NaCl + KCl 20 mEq/L 150 mL/hr at 06/26/23 0608     HYDROmorphone         acetaminophen  975 mg Oral Q6H    Or     acetaminophen  650 mg Rectal Q6H     cyclobenzaprine  10 mg Oral TID     enoxaparin ANTICOAGULANT  40 mg Subcutaneous Q12H     famotidine  20 mg Oral BID    Or     famotidine  20 mg Intravenous BID     insulin aspart  1-4 Units Subcutaneous Q4H     nicotine  1 patch Transdermal Daily     nicotine   Transdermal Q8H     piperacillin-tazobactam  3.375 g Intravenous Q6H     sennosides  8.6 mg Oral BID     simethicone  40 mg Oral 4x Daily     sodium chloride (PF)  3 mL Intracatheter Q8H       Data   Recent Labs   Lab 06/26/23  0808 06/26/23  0712 06/26/23  0616 06/26/23  0159 06/25/23  2309 06/24/23  2213 06/24/23  0807 06/23/23  1046 06/23/23  0737 06/22/23  0756  06/22/23  0657 06/21/23  0746 06/21/23  0633 06/20/23  1919   WBC  --  16.0*  --   --  14.9*  --  12.4*  --  15.4*  --  26.8*   < >  --  19.8*   HGB  --  11.8*  --   --  11.7*  --  11.1*  --  11.6*  --  13.8   < >  --  15.1   MCV  --  86  --   --  87  --  88  --  91  --  90   < >  --  85   PLT  --  476*  --   --  491*  --  415  --  390  --  320   < >  --  406   NA  --   --   --   --   --   --   --   --  138  --  138  --  136 134*   POTASSIUM  --   --   --   --   --   --   --   --  4.1  --  4.9  --  4.2 3.9   CHLORIDE  --   --   --   --   --   --   --   --  104  --  104  --  101 97*   CO2  --   --   --   --   --   --   --   --  24  --  20*  --  19* 23   BUN  --   --   --   --   --   --   --   --  6.2  --  9.7  --  8.5 8.8   CR  --   --   --   --   --   --   --   --  0.76  --  0.96  --  0.83  0.82 0.94   ANIONGAP  --   --   --   --   --   --   --   --  10  --  14  --  16* 14   TEMO  --   --   --   --   --   --   --   --  7.7*  --  7.6*  --  7.9* 9.0   *  --  132* 134*  --    < >  --    < > 146*   < > 154*   < > 201* 128*   ALBUMIN  --   --   --   --   --   --   --   --   --   --   --   --   --  3.4*   PROTTOTAL  --   --   --   --   --   --   --   --   --   --   --   --   --  7.5   BILITOTAL  --   --   --   --   --   --   --   --   --   --   --   --   --  0.7   ALKPHOS  --   --   --   --   --   --   --   --   --   --   --   --   --  73   ALT  --   --   --   --   --   --   --   --   --   --   --   --   --  35   AST  --   --   --   --   --   --   --   --   --   --   --   --   --  14   LIPASE  --   --   --   --   --   --   --   --   --   --   --   --   --  11*    < > = values in this interval not displayed.       No results found for this or any previous visit (from the past 24 hour(s)).

## 2023-06-26 NOTE — PROGRESS NOTES
[id number]  POD#3 exploratory laparoscopy converted to open with appendectomy. Patient AOX4. VSS on RA. Tachycardic.Pt ambulating Independent. Denies N/V.Pain managed with PCA pump.Incision/lap sites CDI. OUSMANE drains LUQ adequate ouput, serous/LLQ minimal ouput, serous.Voiding spontaneously in bathroom. BS audible/No BM on shift, passing flatus. Diet full  liq. Can advance to low fiber as tolerated. Tele Sinus Tach.Continue to monitor. Plan for discharge pending.

## 2023-06-27 ENCOUNTER — APPOINTMENT (OUTPATIENT)
Dept: CT IMAGING | Facility: CLINIC | Age: 28
DRG: 853 | End: 2023-06-27
Attending: RADIOLOGY

## 2023-06-27 LAB
BACTERIA UR CULT: NO GROWTH
ENTEROCOCCUS FAECALIS: NOT DETECTED
ENTEROCOCCUS FAECIUM: NOT DETECTED
ERYTHROCYTE [DISTWIDTH] IN BLOOD BY AUTOMATED COUNT: 14 % (ref 10–15)
GLUCOSE BLDC GLUCOMTR-MCNC: 106 MG/DL (ref 70–99)
GLUCOSE BLDC GLUCOMTR-MCNC: 114 MG/DL (ref 70–99)
GLUCOSE BLDC GLUCOMTR-MCNC: 88 MG/DL (ref 70–99)
GLUCOSE BLDC GLUCOMTR-MCNC: 93 MG/DL (ref 70–99)
GRAM STAIN RESULT: ABNORMAL
HCT VFR BLD AUTO: 33.9 % (ref 40–53)
HGB BLD-MCNC: 11.1 G/DL (ref 13.3–17.7)
LISTERIA SPECIES (DETECTED/NOT DETECTED): NOT DETECTED
MCH RBC QN AUTO: 28.3 PG (ref 26.5–33)
MCHC RBC AUTO-ENTMCNC: 32.7 G/DL (ref 31.5–36.5)
MCV RBC AUTO: 87 FL (ref 78–100)
PLATELET # BLD AUTO: 495 10E3/UL (ref 150–450)
PLATELET # BLD AUTO: 495 10E3/UL (ref 150–450)
RBC # BLD AUTO: 3.92 10E6/UL (ref 4.4–5.9)
STAPHYLOCOCCUS AUREUS: NOT DETECTED
STAPHYLOCOCCUS EPIDERMIDIS: NOT DETECTED
STAPHYLOCOCCUS LUGDUNENSIS: NOT DETECTED
STAPHYLOCOCCUS SPECIES: DETECTED
STREPTOCOCCUS AGALACTIAE: NOT DETECTED
STREPTOCOCCUS ANGINOSUS GROUP: NOT DETECTED
STREPTOCOCCUS PNEUMONIAE: NOT DETECTED
STREPTOCOCCUS PYOGENES: NOT DETECTED
STREPTOCOCCUS SPECIES: NOT DETECTED
WBC # BLD AUTO: 13.7 10E3/UL (ref 4–11)

## 2023-06-27 PROCEDURE — 87106 FUNGI IDENTIFICATION YEAST: CPT | Performed by: RADIOLOGY

## 2023-06-27 PROCEDURE — 36415 COLL VENOUS BLD VENIPUNCTURE: CPT | Performed by: PHYSICIAN ASSISTANT

## 2023-06-27 PROCEDURE — 272N000431 CT ABDOMEN PERITONEUM ABSCESS DRAIN W CATH PLACE

## 2023-06-27 PROCEDURE — 87077 CULTURE AEROBIC IDENTIFY: CPT | Performed by: RADIOLOGY

## 2023-06-27 PROCEDURE — 87205 SMEAR GRAM STAIN: CPT | Performed by: RADIOLOGY

## 2023-06-27 PROCEDURE — 87040 BLOOD CULTURE FOR BACTERIA: CPT | Performed by: INTERNAL MEDICINE

## 2023-06-27 PROCEDURE — 999N000154 HC STATISTIC RADIOLOGY XRAY, US, CT, MAR, NM

## 2023-06-27 PROCEDURE — 49406 IMAGE CATH FLUID PERI/RETRO: CPT

## 2023-06-27 PROCEDURE — 258N000003 HC RX IP 258 OP 636: Performed by: PHYSICIAN ASSISTANT

## 2023-06-27 PROCEDURE — 250N000013 HC RX MED GY IP 250 OP 250 PS 637: Performed by: PHYSICIAN ASSISTANT

## 2023-06-27 PROCEDURE — 36415 COLL VENOUS BLD VENIPUNCTURE: CPT | Performed by: INTERNAL MEDICINE

## 2023-06-27 PROCEDURE — 250N000011 HC RX IP 250 OP 636

## 2023-06-27 PROCEDURE — 120N000001 HC R&B MED SURG/OB

## 2023-06-27 PROCEDURE — 250N000013 HC RX MED GY IP 250 OP 250 PS 637: Performed by: SURGERY

## 2023-06-27 PROCEDURE — 87075 CULTR BACTERIA EXCEPT BLOOD: CPT | Performed by: RADIOLOGY

## 2023-06-27 PROCEDURE — 250N000011 HC RX IP 250 OP 636: Mod: JZ | Performed by: PHYSICIAN ASSISTANT

## 2023-06-27 PROCEDURE — 250N000011 HC RX IP 250 OP 636: Performed by: RADIOLOGY

## 2023-06-27 PROCEDURE — 85049 AUTOMATED PLATELET COUNT: CPT | Performed by: PHYSICIAN ASSISTANT

## 2023-06-27 PROCEDURE — 250N000011 HC RX IP 250 OP 636: Mod: JZ

## 2023-06-27 PROCEDURE — 250N000009 HC RX 250: Performed by: RADIOLOGY

## 2023-06-27 PROCEDURE — 87070 CULTURE OTHR SPECIMN AEROBIC: CPT | Performed by: RADIOLOGY

## 2023-06-27 PROCEDURE — 258N000003 HC RX IP 258 OP 636

## 2023-06-27 PROCEDURE — 99152 MOD SED SAME PHYS/QHP 5/>YRS: CPT

## 2023-06-27 PROCEDURE — 99232 SBSQ HOSP IP/OBS MODERATE 35: CPT | Performed by: INTERNAL MEDICINE

## 2023-06-27 PROCEDURE — 85027 COMPLETE CBC AUTOMATED: CPT | Performed by: PHYSICIAN ASSISTANT

## 2023-06-27 RX ORDER — FLUMAZENIL 0.1 MG/ML
0.2 INJECTION, SOLUTION INTRAVENOUS
Status: DISCONTINUED | OUTPATIENT
Start: 2023-06-27 | End: 2023-06-27

## 2023-06-27 RX ORDER — SIMETHICONE 40MG/0.6ML
40 SUSPENSION, DROPS(FINAL DOSAGE FORM)(ML) ORAL 4 TIMES DAILY PRN
Status: DISCONTINUED | OUTPATIENT
Start: 2023-06-27 | End: 2023-06-29 | Stop reason: HOSPADM

## 2023-06-27 RX ORDER — FENTANYL CITRATE 50 UG/ML
25-50 INJECTION, SOLUTION INTRAMUSCULAR; INTRAVENOUS EVERY 5 MIN PRN
Status: DISCONTINUED | OUTPATIENT
Start: 2023-06-27 | End: 2023-06-27

## 2023-06-27 RX ORDER — NALOXONE HYDROCHLORIDE 0.4 MG/ML
0.2 INJECTION, SOLUTION INTRAMUSCULAR; INTRAVENOUS; SUBCUTANEOUS
Status: DISCONTINUED | OUTPATIENT
Start: 2023-06-27 | End: 2023-06-27

## 2023-06-27 RX ORDER — NALOXONE HYDROCHLORIDE 0.4 MG/ML
0.4 INJECTION, SOLUTION INTRAMUSCULAR; INTRAVENOUS; SUBCUTANEOUS
Status: DISCONTINUED | OUTPATIENT
Start: 2023-06-27 | End: 2023-06-27

## 2023-06-27 RX ORDER — PIPERACILLIN SODIUM, TAZOBACTAM SODIUM 4; .5 G/20ML; G/20ML
4.5 INJECTION, POWDER, LYOPHILIZED, FOR SOLUTION INTRAVENOUS EVERY 6 HOURS
Status: DISCONTINUED | OUTPATIENT
Start: 2023-06-27 | End: 2023-06-29

## 2023-06-27 RX ORDER — HYDROMORPHONE HYDROCHLORIDE 1 MG/ML
.3-.5 INJECTION, SOLUTION INTRAMUSCULAR; INTRAVENOUS; SUBCUTANEOUS
Status: DISCONTINUED | OUTPATIENT
Start: 2023-06-27 | End: 2023-06-29 | Stop reason: HOSPADM

## 2023-06-27 RX ADMIN — ENOXAPARIN SODIUM 40 MG: 40 INJECTION SUBCUTANEOUS at 10:30

## 2023-06-27 RX ADMIN — FAMOTIDINE 20 MG: 20 TABLET ORAL at 10:29

## 2023-06-27 RX ADMIN — FENTANYL CITRATE 50 MCG: 50 INJECTION, SOLUTION INTRAMUSCULAR; INTRAVENOUS at 09:26

## 2023-06-27 RX ADMIN — PIPERACILLIN AND TAZOBACTAM 4.5 G: 4; .5 INJECTION, POWDER, FOR SOLUTION INTRAVENOUS at 13:56

## 2023-06-27 RX ADMIN — OXYCODONE HYDROCHLORIDE 10 MG: 5 TABLET ORAL at 21:00

## 2023-06-27 RX ADMIN — ENOXAPARIN SODIUM 40 MG: 40 INJECTION SUBCUTANEOUS at 21:01

## 2023-06-27 RX ADMIN — VANCOMYCIN HYDROCHLORIDE 2000 MG: 10 INJECTION, POWDER, LYOPHILIZED, FOR SOLUTION INTRAVENOUS at 14:37

## 2023-06-27 RX ADMIN — CYCLOBENZAPRINE 10 MG: 10 TABLET, FILM COATED ORAL at 17:00

## 2023-06-27 RX ADMIN — CYCLOBENZAPRINE 10 MG: 10 TABLET, FILM COATED ORAL at 10:29

## 2023-06-27 RX ADMIN — FAMOTIDINE 20 MG: 20 TABLET ORAL at 21:00

## 2023-06-27 RX ADMIN — CYCLOBENZAPRINE 10 MG: 10 TABLET, FILM COATED ORAL at 21:00

## 2023-06-27 RX ADMIN — OXYCODONE HYDROCHLORIDE 10 MG: 5 TABLET ORAL at 10:29

## 2023-06-27 RX ADMIN — SENNOSIDES AND DOCUSATE SODIUM 1 TABLET: 50; 8.6 TABLET ORAL at 21:00

## 2023-06-27 RX ADMIN — MIDAZOLAM 1 MG: 1 INJECTION INTRAMUSCULAR; INTRAVENOUS at 09:24

## 2023-06-27 RX ADMIN — LIDOCAINE HYDROCHLORIDE 18 ML: 10 INJECTION, SOLUTION EPIDURAL; INFILTRATION; INTRACAUDAL; PERINEURAL at 09:41

## 2023-06-27 RX ADMIN — POTASSIUM CHLORIDE, DEXTROSE MONOHYDRATE AND SODIUM CHLORIDE: 150; 5; 450 INJECTION, SOLUTION INTRAVENOUS at 21:12

## 2023-06-27 RX ADMIN — ACETAMINOPHEN 975 MG: 325 TABLET ORAL at 04:32

## 2023-06-27 RX ADMIN — POTASSIUM CHLORIDE, DEXTROSE MONOHYDRATE AND SODIUM CHLORIDE: 150; 5; 450 INJECTION, SOLUTION INTRAVENOUS at 05:49

## 2023-06-27 RX ADMIN — POLYETHYLENE GLYCOL 3350 17 G: 17 POWDER, FOR SOLUTION ORAL at 10:30

## 2023-06-27 RX ADMIN — PIPERACILLIN AND TAZOBACTAM 3.38 G: 3; .375 INJECTION, POWDER, FOR SOLUTION INTRAVENOUS at 01:50

## 2023-06-27 RX ADMIN — OXYCODONE HYDROCHLORIDE 10 MG: 5 TABLET ORAL at 17:00

## 2023-06-27 RX ADMIN — PIPERACILLIN AND TAZOBACTAM 4.5 G: 4; .5 INJECTION, POWDER, FOR SOLUTION INTRAVENOUS at 21:01

## 2023-06-27 ASSESSMENT — ACTIVITIES OF DAILY LIVING (ADL)
ADLS_ACUITY_SCORE: 24
ADLS_ACUITY_SCORE: 28
ADLS_ACUITY_SCORE: 24
ADLS_ACUITY_SCORE: 28

## 2023-06-27 NOTE — PLAN OF CARE
POD6 xlap and washout, appy. Tolerating small PO intake. Up SBA with much encouragement. Using IS. Fevers continued. CT scan shows fluid collection-plan for CT drainage tomorrow. IV abx and fluids continued. PO pain meds encouraged, still has PCA available. OUSMANE x2. BC and UC pending

## 2023-06-27 NOTE — PHARMACY-VANCOMYCIN DOSING SERVICE
"Pharmacy Vancomycin Initial Note  Date of Service 2023  Patient's  1995  27 year old, male    Indication: Bacteremia    Current estimated CrCl = Estimated Creatinine Clearance: 268.9 mL/min (based on SCr of 0.76 mg/dL).    Creatinine for last 3 days  No results found for requested labs within last 3 days.    Recent Vancomycin Level(s) for last 3 days  No results found for requested labs within last 3 days.      Vancomycin IV Administrations (past 72 hours)                   vancomycin (VANCOCIN) 2,000 mg in 0.9% NaCl 500 mL intermittent infusion (mg) 2,000 mg New Bag 23 1437                Nephrotoxins and other renal medications (From now, onward)    Start     Dose/Rate Route Frequency Ordered Stop    23 0230  vancomycin (VANCOCIN) 1,250 mg in 0.9% NaCl 250 mL intermittent infusion         1,250 mg  over 90 Minutes Intravenous EVERY 12 HOURS 23 1446      23 1400  piperacillin-tazobactam (ZOSYN) 4.5 g vial to attach to  mL bag        Note to Pharmacy: For [facility], [facility] and [facility]: For Zosyn-naive patients, use the \"Zosyn initial dose + extended infusion\" order panel.    4.5 g  over 30 Minutes Intravenous EVERY 6 HOURS 23 1017      23 1230  vancomycin (VANCOCIN) 2,000 mg in 0.9% NaCl 500 mL intermittent infusion         2,000 mg  over 2 Hours Intravenous ONCE 23 1228            Contrast Orders - past 72 hours (72h ago, onward)    Start     Dose/Rate Route Frequency Stop    23 1030  iopamidol (ISOVUE-370) solution 135 mL         135 mL Intravenous ONCE 23 1104          [employer] Prediction of Planned Initial Vancomycin Regimen  Regimen: 1250 mg IV every 12 hours.  Start time: 02:37 on 2023  Exposure target: AUC24 (range)400-600 mg/L.hr   AUC24,ss: 448 mg/L.hr  Probability of AUC24 > 400: 58 %  Ctrough,ss: 11.3 mg/L  Probability of Ctrough,ss > 20: 25 %  Probability of nephrotoxicity (Lodise JOHN ): 7 %        Plan:  1. Start vancomycin   " mg loading dose, then 1250 mg IV q12h.   2. Vancomycin monitoring method: AUC  3. Vancomycin therapeutic monitoring goal: 400-600 mg*h/L  4. Pharmacy will check vancomycin levels as appropriate in 1-3 Days.    5. Serum creatinine levels will be ordered every 48 hours.      Kaley Morales RPH, PharmD, BCPS

## 2023-06-27 NOTE — PROGRESS NOTES
Hospitalist       Provider Role Specialty From [facility]    Hospitalist Progress Note    Date of Service (when I saw the patient): 06/27/2023    Assessment & Plan   Tej Chatman is a 27 year old male with medical history significant for Morbid obesity who was admitted 6/20/23 due to suspected perforated bowel S/P laparoscopic abdominal exploration converted to open with appendectomy, debridement of small bowel mesenteric abscess and lysis of adhesions.        Hospitalist consult has been requested on POD #1 medical management. .      Perforated bowel  Small bowel mesenteric abscess involving mid small bowel, appendix and omentum   S/p laparoscopic abdominal exploration converted to open with appendectomy, debridement of small bowel mesenteric abscess and lysis of adhesions  New fluid collection on CTAP 6/26 - c/f abscess  Afebrile. WBC trending up. On zosyn (6/21-), Vanc (6/27-)  BM last night.  - Plan per surgery.  - IR consulted, placed a third drain 6/27      Sepsis 2/2 ^ + GPC bacteremia;  Tachycardia/Low grade fever  Positive blood cultures, gram positive cocci (6/27)  Has several reasons to be tachy including stress (surgery, hospitalization), infection, pain.   EKG with sinus tachycardia. Febrile to 100.4  - CT chest w/o PE.  - Monitor on telemetry.     - f/u urine culture, blood culture (ordered by primary service 6/26), repeat CTAP as per above  - c/w Zosyn (6/21-), Vanc (6/27-)  - f/u repeat blood cultures ordered 6/27    Severe obesity   - Will need to establish care with a PCP.    - Monitor O2 saturations.    - Marley benefit from outpatient sleep study to assess for LUBNA (smores)..       Hyperglycemia  Prediabetes  HGB A1c checked ant at 5.7%.  Patient received IV dexamethasone intra-op and could contribute to hyperglycemia.    - Low intensity sliding scale insulin initiated.    - Nutrition consult.    - SW/CM consult requested to assist with establishing patient with  PCP.       Hypocalcemia  Could be secondary to aggressive fluids.    - Order ionized calcium level.    - BMP monitoring.     Alcohol use  Patient indicated that he binge drinks.  He does not consume alcohol on a daily basis.  His last drink was Saturday night.    - Monitor.       Tobacco/nicotine use D/O  Patient reported that he vapes quite heavily throughout the day.    - Nicoderm patch.          DVT Prophylaxis: Defer to primary service.    Disp: per primary team.  Needs PCP.      Medicine will continue to follow with you.    Melania Cartwright MD    Interval History   +gram positive cocci in one blood culture  Otherwise doing okay after IR placed third drain today  No fevers, chills, NVD  Repeat bcx ordered  Started on Vanc     Physical Exam   Temp: 100.1  F (37.8  C) Temp src: Oral BP: (!) 158/98 Pulse: 98   Resp: 18 SpO2: 95 % O2 Device: None (Room air) Oxygen Delivery: 2 LPM  Vitals:    06/20/23 1915   Weight: (!) 181.4 kg (400 lb)     Vital Signs with Ranges  Temp:  [98  F (36.7  C)-100.3  F (37.9  C)] 100.1  F (37.8  C)  Pulse:  [] 98  Resp:  [18-19] 18  BP: (122-163)/(70-98) 158/98  SpO2:  [95 %-98 %] 95 %  I/O last 3 completed shifts:  In: 7236 [P.O.:960; I.V.:6276]  Out: 3120 [Urine:3000; Drains:120]    Constitutional: Appears comfortable. Obese.  Respiratory: CTAB.  Cardiovascular: RRR.  GI: post-surgical clips. Drain in place.  Skin/Integumen: no peripheral edema.  Other:      Medications     dextrose 5% and 0.45% NaCl + KCl 20 mEq/L Stopped (06/27/23 1055)       cyclobenzaprine  10 mg Oral TID     enoxaparin ANTICOAGULANT  40 mg Subcutaneous Q12H     famotidine  20 mg Oral BID    Or     famotidine  20 mg Intravenous BID     insulin aspart  1-4 Units Subcutaneous Q4H     nicotine  1 patch Transdermal Daily     nicotine   Transdermal Q8H     piperacillin-tazobactam  4.5 g Intravenous Q6H     polyethylene glycol  17 g Oral Daily     senna-docusate  1 tablet Oral BID    Or     senna-docusate  2  tablet Oral BID     sodium chloride (PF)  10 mL Irrigation Q8H     sodium chloride (PF)  3 mL Intracatheter Q8H       Data   Recent Labs   Lab 06/27/23  1038 06/27/23  0700 06/27/23  0426 06/26/23  2347 06/26/23  0808 06/26/23  0712 06/26/23  0159 06/25/23  2309 06/23/23  1046 06/23/23  0737 06/22/23  0756 06/22/23  0657 06/21/23  0746 06/21/23  0633 06/20/23  1919   WBC  --  13.7*  --   --   --  16.0*  --  14.9*   < > 15.4*  --  26.8*   < >  --  19.8*   HGB  --  11.1*  --   --   --  11.8*  --  11.7*   < > 11.6*  --  13.8   < >  --  15.1   MCV  --  87  --   --   --  86  --  87   < > 91  --  90   < >  --  85   PLT  --  495*  495*  --   --   --  476*  --  491*   < > 390  --  320   < >  --  406   NA  --   --   --   --   --   --   --   --   --  138  --  138  --  136 134*   POTASSIUM  --   --   --   --   --   --   --   --   --  4.1  --  4.9  --  4.2 3.9   CHLORIDE  --   --   --   --   --   --   --   --   --  104  --  104  --  101 97*   CO2  --   --   --   --   --   --   --   --   --  24  --  20*  --  19* 23   BUN  --   --   --   --   --   --   --   --   --  6.2  --  9.7  --  8.5 8.8   CR  --   --   --   --   --   --   --   --   --  0.76  --  0.96  --  0.83  0.82 0.94   ANIONGAP  --   --   --   --   --   --   --   --   --  10  --  14  --  16* 14   TEMO  --   --   --   --   --   --   --   --   --  7.7*  --  7.6*  --  7.9* 9.0   *  --  114* 107*   < >  --    < >  --    < > 146*   < > 154*   < > 201* 128*   ALBUMIN  --   --   --   --   --   --   --   --   --   --   --   --   --   --  3.4*   PROTTOTAL  --   --   --   --   --   --   --   --   --   --   --   --   --   --  7.5   BILITOTAL  --   --   --   --   --   --   --   --   --   --   --   --   --   --  0.7   ALKPHOS  --   --   --   --   --   --   --   --   --   --   --   --   --   --  73   ALT  --   --   --   --   --   --   --   --   --   --   --   --   --   --  35   AST  --   --   --   --   --   --   --   --   --   --   --   --   --   --  14   LIPASE  --   --   --    --   --   --   --   --   --   --   --   --   --   --  11*    < > = values in this interval not displayed.       Recent Results (from the past 24 hour(s))   CT Abdomen Pelvis w Contrast    Narrative    CT ABDOMEN PELVIS WITH CONTRAST 6/26/2023 12:13 PM    CLINICAL HISTORY: Leukocytosis and fevers after recent exploratory  laparotomy for intra-abdominal free air/ infection. Source of  perforation not identified. Two surgical drains in place.    TECHNIQUE: CT scan of the abdomen and pelvis was performed following  injection of IV contrast. Multiplanar reformats were obtained. Dose  reduction techniques were used.  CONTRAST: 135mL Isovue-370    COMPARISON: 6/20/2023    FINDINGS:   LOWER CHEST: Trace bilateral pleural effusions and underlying  atelectasis have developed.    HEPATOBILIARY: Probable hepatic steatosis.    PANCREAS: Normal.    SPLEEN: Normal.    ADRENAL GLANDS: Normal.    KIDNEYS/BLADDER: Normal.    BOWEL: Mildly thickened loops of mid small bowel, similar to previous.  The small bowel dilation seen previously has improved. There is a new  loculated gas and fluid collection left mid abdomen measuring 10 x 7  cm. Two new surgical drains in the pelvis do not drain this loculated  collection. Additional small amount of free fluid in the pelvic  cul-de-sac. Generalized free intraperitoneal air seen previously has  otherwise resolved.    PELVIC ORGANS: Normal.    ADDITIONAL FINDINGS: None.    MUSCULOSKELETAL: Normal.      Impression    IMPRESSION:   New loculated air and fluid collection in the left midabdomen adjacent  to abnormal small bowel loops, suspicious for an abscess.    ARNIE DORSEY MD         SYSTEM ID:  [id number]   CT Abdomen Peritonium Abscess Drainage    Narrative    EXAM:  1. PERCUTANEOUS DRAIN PLACEMENT LEFT ABDOMINAL ABSCESS  2. CT GUIDANCE  3. CONSCIOUS SEDATION  DATE/TIME: 6/27/2023 9:56 AM    INDICATION: Abdominal abscess  TECHNIQUE: Dose reduction techniques were used.    PROCEDURE:  Informed consent obtained. Site marked. Prior images  reviewed. Required items made available. Patient identity confirmed  verbally and with arm band. Patient reevaluated immediately before  administering sedation. Universal protocol was followed. Time out  performed. The site was prepped and draped in sterile fashion. 10 mL  of 1% lidocaine was infused into the local soft tissues. Using  standard technique and under direct CT guidance, a 12 Armenian drainage  catheter was inserted into the fluid collection.      SPECIMEN: 100 mL of malodorous, brown cloudy fluid was aspirated and  sent to lab for cultures and Gram stain.    BLOOD LOSS: Minimal.    The patient tolerated the procedure well. No immediate complications.    RADIOLOGIC SUPERVISION AND INTERPRETATION:   CT GUIDANCE: Images demonstrate the needle and subsequent catheter to  be in good position.    SEDATION: Versed 1 mg. Fentanyl 50 mcg. The procedure was performed  with administration intravenous conscious sedation with appropriate  preoperative, intraoperative, and postoperative evaluation.    15 minutes of supervised face to face conscious sedation time was  provided by a radiology nurse under my direct supervision.      Impression    IMPRESSION:  1.  Successful CT-guided drain placement into intra-abdominal abscess.    ARNIE DORSEY MD         SYSTEM ID:  [id number]

## 2023-06-27 NOTE — PROGRESS NOTES
Paged hospitalist to let them know that Lab called stating his L hand blood culture came back as gram positive cocci in cluster. Thank you.     Hospitalist said will order repeat lab.

## 2023-06-27 NOTE — PLAN OF CARE
POD 7 ex lap converted to open with appendectomy. A&Ox4, VSS on RA. PIV infusing D5 + NS 0.45% + Kcl 150 ml/hr w/ int abx. NPO. Up independently in room, voids in urinal. Lap sites CDI. OUSMANE drain 2x in place w/ minimal drainage. Tele on NSR, tachy at times. Pain managed w/ scheduled tylenol and PCA pump. Denies N/V. Plan to have a CT guided drain placement today.

## 2023-06-27 NOTE — PROGRESS NOTES
General Surgery Progress Note    Admission Date: 6/20/2023  Today's Date: 6/27/2023         Assessment:      Tej Chatman is a 27 year old male who presented with intra-abdominal free air, now s/p exploratory laparoscopy converted to laparotomy with appendectomy, abdominal lavage, and drain placement. No definitive source of perforation identified. Moderate amount of cloudy fluid throughout abdomen. POD 6.     Underwent CT-guided drain placement into intra-abdominal abscess today. 100cc brown fluid removed during procedure.         Plan:   - Resume clear liquid diet, advance to low fiber as tolerated  - Continue bowel regimen for now, hold if significant loose stools  - Stop PCA. Transition to PO pain meds with IV dilaudid available for breakthrough. Simethicone also available prn  - 3 drains currently in place. Surgical drains not communicating with abscess and draining serous fluid. Plan to remove today, will confirm with Dr. Bruce.   - Follow output from IR-placed drain. Likely repeat scan eventually to determine timing for removal  - Continue Zosyn. Blood cultures NGTD, urine cultures pending. Cultures ordered from aspirate today.  - Monitor fever curve and vitals, heart rate continues to improve. Encourage aggressive IS use  - Out of bed at least QID, needs more encouragement today. PCDs while resting, Lovenox BID for DVT ppx  - Social work consulted for discharge planning. Hospitalist following for medical issues. Greatly appreciate input     Dispo: anticipate at least 2-3 more days in hospital        Interval History:   Tmax 100.3. Pulse improving - 90s this morning. Some hypertension at times. Intermittently requiring O2, on 2L via NC this morning. Tej is overall feeling fairly well. Was tolerating solid food yesterday without nausea. Passing gas regularly, having some loose stools. Pain is present, worse with movement. Not using PCA much.           Physical Exam:   /71 (BP Location: Left  "arm)   Pulse 96   Temp 98.1  F (36.7  C) (Oral)   Resp 18   Ht 2.032 m (6' 8\")   Wt (!) 181.4 kg (400 lb)   SpO2 97%   BMI 43.94 kg/m    I/O last 3 completed shifts:  In: 7236 [P.O.:960; I.V.:6276]  Out: 3120 [Urine:3000; Drains:120]  General: NAD, pleasant, alert and oriented x3  Respiratory: non-labored breathing  Abdomen: slightly distended but overall soft with mild appropriate tenderness throughout. Staples in place without erythema or drainage. 2 surgical drains with serosanguinous fluid. New IR drain with cloudy brown fluid.   Extremities: scant symmetric bilateral lower extremity edema     LABS:  Recent Labs   Lab Test 06/27/23  0700 06/26/23  0712 06/25/23  2309 06/24/23  0807   WBC  --  16.0* 14.9* 12.4*   HGB  --  11.8* 11.7* 11.1*   MCV  --  86 87 88   * 476* 491* 415     -------------------------------    Linh Watt PA-C  [facility]  [phone]      "

## 2023-06-27 NOTE — PROGRESS NOTES
Care Suites Procedure Nursing Note    Patient Information  Name: Tej Chatman  Age: 27 year old    Procedure  Procedure: CT drain  Procedure start time: 0924  Procedure complete time: 0941  Concerns/abnormal assessment: No.  If abnormal assessment, provider notified: N/A  Plan/Other: Transfer back to In-patient unit.       CT Drain placement : Pt tolerated well. VSS. Total sedation given - 50 mcg Fentanyl and 1 mg Versed. Drain placed w/o difficulty.  100 cc brown fluid removed & specimen sent to lab.  Connected to OUSMANE bulb - full suction. Stay Fix drsg applied & tube secured.    0959 Pt back to rm 2221-01 per cart & transport. Detailed report called to VERONICA Kaiser.      Mary Brown RN

## 2023-06-28 LAB
CREAT SERPL-MCNC: 0.69 MG/DL (ref 0.67–1.17)
ERYTHROCYTE [DISTWIDTH] IN BLOOD BY AUTOMATED COUNT: 14 % (ref 10–15)
GFR SERPL CREATININE-BSD FRML MDRD: >90 ML/MIN/1.73M2
GLUCOSE BLDC GLUCOMTR-MCNC: 100 MG/DL (ref 70–99)
GLUCOSE BLDC GLUCOMTR-MCNC: 101 MG/DL (ref 70–99)
GLUCOSE BLDC GLUCOMTR-MCNC: 125 MG/DL (ref 70–99)
GLUCOSE BLDC GLUCOMTR-MCNC: 125 MG/DL (ref 70–99)
GLUCOSE BLDC GLUCOMTR-MCNC: 85 MG/DL (ref 70–99)
HCT VFR BLD AUTO: 38.7 % (ref 40–53)
HGB BLD-MCNC: 12.3 G/DL (ref 13.3–17.7)
MCH RBC QN AUTO: 27.6 PG (ref 26.5–33)
MCHC RBC AUTO-ENTMCNC: 31.8 G/DL (ref 31.5–36.5)
MCV RBC AUTO: 87 FL (ref 78–100)
PLATELET # BLD AUTO: 475 10E3/UL (ref 150–450)
RBC # BLD AUTO: 4.45 10E6/UL (ref 4.4–5.9)
WBC # BLD AUTO: 10.4 10E3/UL (ref 4–11)

## 2023-06-28 PROCEDURE — 120N000001 HC R&B MED SURG/OB

## 2023-06-28 PROCEDURE — 36415 COLL VENOUS BLD VENIPUNCTURE: CPT

## 2023-06-28 PROCEDURE — 250N000013 HC RX MED GY IP 250 OP 250 PS 637: Performed by: PHYSICIAN ASSISTANT

## 2023-06-28 PROCEDURE — 250N000011 HC RX IP 250 OP 636

## 2023-06-28 PROCEDURE — 250N000011 HC RX IP 250 OP 636: Mod: JZ

## 2023-06-28 PROCEDURE — 99232 SBSQ HOSP IP/OBS MODERATE 35: CPT | Performed by: INTERNAL MEDICINE

## 2023-06-28 PROCEDURE — 82565 ASSAY OF CREATININE: CPT

## 2023-06-28 PROCEDURE — 250N000013 HC RX MED GY IP 250 OP 250 PS 637: Performed by: SURGERY

## 2023-06-28 PROCEDURE — 250N000011 HC RX IP 250 OP 636: Mod: JZ | Performed by: PHYSICIAN ASSISTANT

## 2023-06-28 PROCEDURE — 85027 COMPLETE CBC AUTOMATED: CPT | Performed by: PHYSICIAN ASSISTANT

## 2023-06-28 PROCEDURE — 258N000003 HC RX IP 258 OP 636

## 2023-06-28 PROCEDURE — 36415 COLL VENOUS BLD VENIPUNCTURE: CPT | Performed by: PHYSICIAN ASSISTANT

## 2023-06-28 RX ADMIN — FAMOTIDINE 20 MG: 20 TABLET ORAL at 08:08

## 2023-06-28 RX ADMIN — OXYCODONE HYDROCHLORIDE 10 MG: 5 TABLET ORAL at 10:52

## 2023-06-28 RX ADMIN — POLYETHYLENE GLYCOL 3350 17 G: 17 POWDER, FOR SOLUTION ORAL at 08:08

## 2023-06-28 RX ADMIN — FAMOTIDINE 20 MG: 20 TABLET ORAL at 20:22

## 2023-06-28 RX ADMIN — PIPERACILLIN AND TAZOBACTAM 4.5 G: 4; .5 INJECTION, POWDER, FOR SOLUTION INTRAVENOUS at 08:06

## 2023-06-28 RX ADMIN — CYCLOBENZAPRINE 10 MG: 10 TABLET, FILM COATED ORAL at 22:29

## 2023-06-28 RX ADMIN — CYCLOBENZAPRINE 10 MG: 10 TABLET, FILM COATED ORAL at 08:08

## 2023-06-28 RX ADMIN — OXYCODONE HYDROCHLORIDE 10 MG: 5 TABLET ORAL at 14:47

## 2023-06-28 RX ADMIN — ENOXAPARIN SODIUM 40 MG: 40 INJECTION SUBCUTANEOUS at 08:08

## 2023-06-28 RX ADMIN — SENNOSIDES AND DOCUSATE SODIUM 1 TABLET: 50; 8.6 TABLET ORAL at 20:22

## 2023-06-28 RX ADMIN — OXYCODONE HYDROCHLORIDE 10 MG: 5 TABLET ORAL at 04:42

## 2023-06-28 RX ADMIN — PIPERACILLIN AND TAZOBACTAM 4.5 G: 4; .5 INJECTION, POWDER, FOR SOLUTION INTRAVENOUS at 02:19

## 2023-06-28 RX ADMIN — CYCLOBENZAPRINE 10 MG: 10 TABLET, FILM COATED ORAL at 15:06

## 2023-06-28 RX ADMIN — PIPERACILLIN AND TAZOBACTAM 4.5 G: 4; .5 INJECTION, POWDER, FOR SOLUTION INTRAVENOUS at 20:21

## 2023-06-28 RX ADMIN — VANCOMYCIN HYDROCHLORIDE 1250 MG: 10 INJECTION, POWDER, LYOPHILIZED, FOR SOLUTION INTRAVENOUS at 03:03

## 2023-06-28 RX ADMIN — ENOXAPARIN SODIUM 40 MG: 40 INJECTION SUBCUTANEOUS at 20:22

## 2023-06-28 RX ADMIN — SENNOSIDES AND DOCUSATE SODIUM 1 TABLET: 50; 8.6 TABLET ORAL at 08:08

## 2023-06-28 RX ADMIN — PIPERACILLIN AND TAZOBACTAM 4.5 G: 4; .5 INJECTION, POWDER, FOR SOLUTION INTRAVENOUS at 14:48

## 2023-06-28 ASSESSMENT — ACTIVITIES OF DAILY LIVING (ADL)
ADLS_ACUITY_SCORE: 24

## 2023-06-28 NOTE — PLAN OF CARE
Goal Outcome Evaluation:       Patient A&OX4. VSS on RA. New OUSMANE drain place today by IR, had moderate amount of tan purulent drainage, irrigated per order. 2 old OUSMANE drains removed this evening, dressing CDI. Midline incision- staples, GENARO. Encourage ambulation as able independently. Audible BS, not passing flatus today. Advanced to low fiber this evening, pt report not much appetite, was able to eat some jello. PCA discontinued, oxycodone and schedule flexeril given for pain. Continue to monitor.

## 2023-06-28 NOTE — PLAN OF CARE
06/28/23 0700 - 1900    AOX4; low fiber diet, poor appetite;  VSS RA ex HTN; midline incision GENARO with staples; x1 khadijah bulb sction; tan/purulent drainage; up independent; 10 mg oxy x1 given; plan discharge tomorrow.

## 2023-06-28 NOTE — PLAN OF CARE
Goal Outcome Evaluation:      Plan of Care Reviewed With: patient    Overall Patient Progress: improvingOverall Patient Progress: improving    Shift 2373-9149:    POD 8 from an appendectomy and abdominal lavage. A&O x4. CMS WDL. Pt is on a low fiber diet, minimal intake per previous RN; denies N/V. VSS, ex tachycardia and HTN. Midline incision GENARO w/ staples. X1 OUSMANE to bulb suction, irrigating [id number], milky/purulent drainage. Up independently. C/o mild to moderate pain, decreased with oxycodone. Discharge pending.

## 2023-06-28 NOTE — PROGRESS NOTES
Contacted lab to draw CBC, was told someone tried and couldn't get it, they will send someone else to try

## 2023-06-28 NOTE — PROGRESS NOTES
General surgery  New drain in place  Will remove surgical drains  advance diet, hope to transition to oral abx soon

## 2023-06-28 NOTE — PROGRESS NOTES
General Surgery Progress Note    Admission Date: 6/20/2023  Today's Date: 6/28/2023         Assessment:      Tej Chatman is a 27 year old male who presented with intra-abdominal free air, now s/p exploratory laparoscopy converted to laparotomy with appendectomy, abdominal lavage, and drain placement. No definitive source of perforation identified. Moderate amount of cloudy fluid throughout abdomen. POD 7.     - Underwent CT-guided drain placement into intra-abdominal abscess 6/27  - Blood cultures positive for gram-positive cocci (contaminant?)         Plan:   - Continue low fiber diet.    - On Zosyn since admission, Vancomycin added 6/27 for GPCs in blood cultures. Defer length of antibiotic therapy to hospitalist team   - Continue IR drain. Anticipate repeat CT/sinogram prior to removal, monitor outputs. This could be done as an outpatient  - Surgical drains removed, change dressings daily. Staples to remain in place 1 more week  - Monitor fever curve and vitals, tachycardia has essentially resolved. Encourage more aggressive IS use and activity. Ambulate at least QID. Lovenox BID for DVT ppx  - Social work consulted, appreciate input  - Medical management per hospitalist  - Await repeat CBC today, white count improved yesterday    Patient hopefully will be ready for discharge as soon as tomorrow from a surgical standpoint, but need for IV antibiotics may be a limiting factor. Greatly appreciate hospitalist input regarding length of antibiotic therapy needed if blood culture is considered a true positive and not contaminant.         Interval History:   Tmax 100.1, vitals stable. Heart rate 90s up to 109, mild hypertension at times. Needed some O2 yesterday morning, has been on room air since then. Tej is feeling fairly well. Has a good appetite, no nausea/vomiting. Passing gas and has been having BMs but not much since yesterday since he didn't eat all day. Very eager to discharge.   135cc out from  "IR-placed drain yesterday.          Physical Exam:   BP (!) 146/84 (BP Location: Right arm)   Pulse 98   Temp 98.2  F (36.8  C) (Oral)   Resp 18   Ht 2.032 m (6' 8\")   Wt (!) 181.4 kg (400 lb)   SpO2 94%   BMI 43.94 kg/m    I/O last 3 completed shifts:  In: 3616 [I.V.:3616]  Out: 1605 [Urine:1450; Drains:155]  General: NAD, pleasant, alert and oriented x3  Respiratory: non-labored breathing  Abdomen: soft, no significant distention, mild appropriate tenderness throughout. Staples in place without erythema or drainage. Previous drain sites now with dry dressings in place. New IR drain with cloudy brown fluid.   Extremities: no lower extremity edema    LABS:  Recent Labs   Lab Test 06/27/23  0700 06/26/23  0712 06/25/23  2309   WBC 13.7* 16.0* 14.9*   HGB 11.1* 11.8* 11.7*   MCV 87 86 87   *  495* 476* 491*        -------------------------------    Linh Watt PA-C  [facility]  [phone]      "

## 2023-06-28 NOTE — PROGRESS NOTES
Hospitalist       Provider Role Specialty From [facility]    Hospitalist Progress Note    Date of Service (when I saw the patient): 06/28/2023    Assessment & Plan   Tej Chatman is a 27 year old male with medical history significant for Morbid obesity who was admitted 6/20/23 due to suspected perforated bowel S/P laparoscopic abdominal exploration converted to open with appendectomy, debridement of small bowel mesenteric abscess and lysis of adhesions.        Hospitalist consult has been requested on POD #1 medical management. .    Perforated bowel  Small bowel mesenteric abscess involving mid small bowel, appendix and omentum   S/p laparoscopic abdominal exploration converted to open with appendectomy, debridement of small bowel mesenteric abscess and lysis of adhesions  New fluid collection on CTAP 6/26 - c/f abscess    Afebrile. Leukocytosis resolved. On zosyn (6/21-), likely needs 14 days total of antibiotics. DCed Vanc (6/27-6/28)  BM last night.  - s/p surgery  - IR consulted, placed a third drain 6/27   - follow aspirate cultures from abdomen ---> Gram negative bacilli and gram positive cocci seen on gram stain      Sepsis 2/2 ^   Tachycardia/Low grade fever  Positive blood cultures, gram positive cocci (6/27)  Has several reasons to be tachy including stress (surgery, hospitalization), infection, pain.   EKG with sinus tachycardia. Febrile to 100.4  - CT chest w/o PE.  - Monitor on telemetry.     - f/u urine culture, blood culture (ordered by primary service 6/26), repeat CTAP as per above  - c/w Zosyn (6/21-)  - follow aspirate cultures from abdomen ---> Gram negative bacilli and gram positive cocci seen on gram stain     GPC Bacteremia - coag neg staph, likely contaminant  - s/p 1 dose of Vanc (6/27-6/28)  - f/u repeat blood cultures ordered 6/27  - continue to monitor on just zosyn     Severe obesity   - Will need to establish care with a PCP.    - Monitor O2  saturations.    - [place] benefit from outpatient sleep study to assess for LUBNA (smores)..       Hyperglycemia  Prediabetes  HGB A1c checked ant at 5.7%.  Patient received IV dexamethasone intra-op and could contribute to hyperglycemia.    - Low intensity sliding scale insulin initiated.    - Nutrition consult.    - SW/CM consult requested to assist with establishing patient with PCP.       Hypocalcemia  Could be secondary to aggressive fluids.    - Order ionized calcium level.    - BMP monitoring.     Alcohol use  Patient indicated that he binge drinks.  He does not consume alcohol on a daily basis.  His last drink was Saturday night.    - Monitor.       Tobacco/nicotine use D/O  Patient reported that he vapes quite heavily throughout the day.    - Nicoderm patch.          DVT Prophylaxis: Defer to primary service.    Disp: per primary team.  Needs PCP.      Medicine will continue to follow with you.    Melania Cartwright MD    Interval History   Doing ok  Bacteremia likely contaminent, repeat negative/no growth  Once sensitivities from abdominal aspirate come back, can narrow abx to oral regimen to finish 14 day total course (from 6/21)  No NVD      Physical Exam   Temp: 98.2  F (36.8  C) Temp src: Oral BP: (!) 146/84 Pulse: 98   Resp: 18 SpO2: 94 % O2 Device: None (Room air) Oxygen Delivery: 2 LPM  Vitals:    06/20/23 1915   Weight: (!) 181.4 kg (400 lb)     Vital Signs with Ranges  Temp:  [97.9  F (36.6  C)-100.1  F (37.8  C)] 98.2  F (36.8  C)  Pulse:  [] 98  Resp:  [16-18] 18  BP: (122-158)/(70-98) 146/84  SpO2:  [93 %-98 %] 94 %  I/O last 3 completed shifts:  In: 3616 [I.V.:3616]  Out: 1605 [Urine:1450; Drains:155]    Constitutional: Appears comfortable. Obese.  Respiratory: CTAB.  Cardiovascular: RRR.  GI: post-surgical clips. Drain in place.  Skin/Integumen: no peripheral edema.  Other:      Medications     dextrose 5% and 0.45% NaCl + KCl 20 mEq/L 150 mL/hr at 06/27/23 2112       cyclobenzaprine  10  mg Oral TID     enoxaparin ANTICOAGULANT  40 mg Subcutaneous Q12H     famotidine  20 mg Oral BID    Or     famotidine  20 mg Intravenous BID     insulin aspart  1-4 Units Subcutaneous Q4H     nicotine  1 patch Transdermal Daily     nicotine   Transdermal Q8H     piperacillin-tazobactam  4.5 g Intravenous Q6H     polyethylene glycol  17 g Oral Daily     senna-docusate  1 tablet Oral BID    Or     senna-docusate  2 tablet Oral BID     sodium chloride (PF)  10 mL Irrigation Q8H     sodium chloride (PF)  3 mL Intracatheter Q8H     vancomycin  1,250 mg Intravenous Q12H       Data   Recent Labs   Lab 06/28/23  0731 06/28/23  0446 06/28/23  0012 06/27/23 2003 06/27/23  1038 06/27/23  0700 06/26/23  0808 06/26/23  0712 06/26/23  0159 06/25/23  2309 06/23/23  1046 06/23/23  0737 06/22/23  0756 06/22/23  0657   WBC  --   --   --   --   --  13.7*  --  16.0*  --  14.9*   < > 15.4*  --  26.8*   HGB  --   --   --   --   --  11.1*  --  11.8*  --  11.7*   < > 11.6*  --  13.8   MCV  --   --   --   --   --  87  --  86  --  87   < > 91  --  90   PLT  --   --   --   --   --  495*  495*  --  476*  --  491*   < > 390  --  320   NA  --   --   --   --   --   --   --   --   --   --   --  138  --  138   POTASSIUM  --   --   --   --   --   --   --   --   --   --   --  4.1  --  4.9   CHLORIDE  --   --   --   --   --   --   --   --   --   --   --  104  --  104   CO2  --   --   --   --   --   --   --   --   --   --   --  24  --  20*   BUN  --   --   --   --   --   --   --   --   --   --   --  6.2  --  9.7   CR 0.69  --   --   --   --   --   --   --   --   --   --  0.76  --  0.96   ANIONGAP  --   --   --   --   --   --   --   --   --   --   --  10  --  14   TEMO  --   --   --   --   --   --   --   --   --   --   --  7.7*  --  7.6*   GLC  --  100* 125* 93   < >  --    < >  --    < >  --    < > 146*   < > 154*    < > = values in this interval not displayed.       Recent Results (from the past 24 hour(s))   CT Abdomen Peritonium Abscess Drainage     Narrative    EXAM:  1. PERCUTANEOUS DRAIN PLACEMENT LEFT ABDOMINAL ABSCESS  2. CT GUIDANCE  3. CONSCIOUS SEDATION  DATE/TIME: 6/27/2023 9:56 AM    INDICATION: Abdominal abscess  TECHNIQUE: Dose reduction techniques were used.    PROCEDURE: Informed consent obtained. Site marked. Prior images  reviewed. Required items made available. Patient identity confirmed  verbally and with arm band. Patient reevaluated immediately before  administering sedation. Universal protocol was followed. Time out  performed. The site was prepped and draped in sterile fashion. 10 mL  of 1% lidocaine was infused into the local soft tissues. Using  standard technique and under direct CT guidance, a 12 Uzbek drainage  catheter was inserted into the fluid collection.      SPECIMEN: 100 mL of malodorous, brown cloudy fluid was aspirated and  sent to lab for cultures and Gram stain.    BLOOD LOSS: Minimal.    The patient tolerated the procedure well. No immediate complications.    RADIOLOGIC SUPERVISION AND INTERPRETATION:   CT GUIDANCE: Images demonstrate the needle and subsequent catheter to  be in good position.    SEDATION: Versed 1 mg. Fentanyl 50 mcg. The procedure was performed  with administration intravenous conscious sedation with appropriate  preoperative, intraoperative, and postoperative evaluation.    15 minutes of supervised face to face conscious sedation time was  provided by a radiology nurse under my direct supervision.      Impression    IMPRESSION:  1.  Successful CT-guided drain placement into intra-abdominal abscess.    ARNIE DORSEY MD         SYSTEM ID:  [id number]

## 2023-06-29 VITALS
OXYGEN SATURATION: 95 % | SYSTOLIC BLOOD PRESSURE: 163 MMHG | RESPIRATION RATE: 16 BRPM | WEIGHT: 315 LBS | BODY MASS INDEX: 36.45 KG/M2 | HEART RATE: 102 BPM | HEIGHT: 78 IN | DIASTOLIC BLOOD PRESSURE: 84 MMHG | TEMPERATURE: 98.6 F

## 2023-06-29 LAB
BACTERIA ASPIRATE CULT: ABNORMAL
BACTERIA BLD CULT: ABNORMAL
BACTERIA BLD CULT: ABNORMAL
ERYTHROCYTE [DISTWIDTH] IN BLOOD BY AUTOMATED COUNT: 13.9 % (ref 10–15)
GLUCOSE BLDC GLUCOMTR-MCNC: 100 MG/DL (ref 70–99)
GLUCOSE BLDC GLUCOMTR-MCNC: 90 MG/DL (ref 70–99)
GLUCOSE BLDC GLUCOMTR-MCNC: 91 MG/DL (ref 70–99)
HCT VFR BLD AUTO: 35.5 % (ref 40–53)
HGB BLD-MCNC: 11.4 G/DL (ref 13.3–17.7)
MCH RBC QN AUTO: 28 PG (ref 26.5–33)
MCHC RBC AUTO-ENTMCNC: 32.1 G/DL (ref 31.5–36.5)
MCV RBC AUTO: 87 FL (ref 78–100)
PLATELET # BLD AUTO: 445 10E3/UL (ref 150–450)
RBC # BLD AUTO: 4.07 10E6/UL (ref 4.4–5.9)
WBC # BLD AUTO: 10.5 10E3/UL (ref 4–11)

## 2023-06-29 PROCEDURE — 250N000011 HC RX IP 250 OP 636: Mod: JZ

## 2023-06-29 PROCEDURE — 99232 SBSQ HOSP IP/OBS MODERATE 35: CPT | Performed by: INTERNAL MEDICINE

## 2023-06-29 PROCEDURE — 250N000011 HC RX IP 250 OP 636: Mod: JZ | Performed by: PHYSICIAN ASSISTANT

## 2023-06-29 PROCEDURE — 36415 COLL VENOUS BLD VENIPUNCTURE: CPT | Performed by: PHYSICIAN ASSISTANT

## 2023-06-29 PROCEDURE — 250N000013 HC RX MED GY IP 250 OP 250 PS 637: Performed by: SURGERY

## 2023-06-29 PROCEDURE — 85018 HEMOGLOBIN: CPT | Performed by: PHYSICIAN ASSISTANT

## 2023-06-29 PROCEDURE — 250N000013 HC RX MED GY IP 250 OP 250 PS 637: Performed by: PHYSICIAN ASSISTANT

## 2023-06-29 RX ORDER — CYCLOBENZAPRINE HCL 10 MG
5-10 TABLET ORAL 3 TIMES DAILY PRN
Qty: 12 TABLET | Refills: 0 | Status: SHIPPED | OUTPATIENT
Start: 2023-06-29 | End: 2023-07-05

## 2023-06-29 RX ORDER — METRONIDAZOLE 500 MG/1
500 TABLET ORAL 3 TIMES DAILY
Qty: 15 TABLET | Refills: 0 | Status: SHIPPED | OUTPATIENT
Start: 2023-06-29 | End: 2023-07-05

## 2023-06-29 RX ORDER — FLUCONAZOLE 200 MG/1
400 TABLET ORAL DAILY
Qty: 10 TABLET | Refills: 0 | Status: SHIPPED | OUTPATIENT
Start: 2023-06-29 | End: 2023-07-05

## 2023-06-29 RX ORDER — FLUCONAZOLE 2 MG/ML
400 INJECTION, SOLUTION INTRAVENOUS EVERY 24 HOURS
Status: DISCONTINUED | OUTPATIENT
Start: 2023-06-29 | End: 2023-06-29 | Stop reason: HOSPADM

## 2023-06-29 RX ORDER — PIPERACILLIN SODIUM, TAZOBACTAM SODIUM 3; .375 G/15ML; G/15ML
3.38 INJECTION, POWDER, LYOPHILIZED, FOR SOLUTION INTRAVENOUS EVERY 8 HOURS
Status: DISCONTINUED | OUTPATIENT
Start: 2023-06-29 | End: 2023-06-29

## 2023-06-29 RX ORDER — LEVOFLOXACIN 750 MG/1
750 TABLET, FILM COATED ORAL DAILY
Qty: 5 TABLET | Refills: 0 | Status: SHIPPED | OUTPATIENT
Start: 2023-06-29 | End: 2023-07-05

## 2023-06-29 RX ORDER — POLYETHYLENE GLYCOL 3350 17 G/17G
17 POWDER, FOR SOLUTION ORAL DAILY PRN
Qty: 510 G | Refills: 0 | Status: SHIPPED | OUTPATIENT
Start: 2023-06-29 | End: 2024-05-30

## 2023-06-29 RX ORDER — LEVOFLOXACIN 5 MG/ML
750 INJECTION, SOLUTION INTRAVENOUS EVERY 24 HOURS
Status: DISCONTINUED | OUTPATIENT
Start: 2023-06-29 | End: 2023-06-29 | Stop reason: HOSPADM

## 2023-06-29 RX ORDER — OXYCODONE HYDROCHLORIDE 5 MG/1
5-10 TABLET ORAL EVERY 6 HOURS PRN
Qty: 12 TABLET | Refills: 0 | Status: SHIPPED | OUTPATIENT
Start: 2023-06-29 | End: 2023-07-05

## 2023-06-29 RX ORDER — AMOXICILLIN 250 MG
1-2 CAPSULE ORAL 2 TIMES DAILY PRN
Qty: 20 TABLET | Refills: 0 | Status: SHIPPED | OUTPATIENT
Start: 2023-06-29 | End: 2024-05-30

## 2023-06-29 RX ADMIN — SENNOSIDES AND DOCUSATE SODIUM 1 TABLET: 50; 8.6 TABLET ORAL at 08:26

## 2023-06-29 RX ADMIN — POLYETHYLENE GLYCOL 3350 17 G: 17 POWDER, FOR SOLUTION ORAL at 08:26

## 2023-06-29 RX ADMIN — OXYCODONE HYDROCHLORIDE 10 MG: 5 TABLET ORAL at 09:11

## 2023-06-29 RX ADMIN — CYCLOBENZAPRINE 10 MG: 10 TABLET, FILM COATED ORAL at 08:26

## 2023-06-29 RX ADMIN — LEVOFLOXACIN 750 MG: 5 INJECTION, SOLUTION INTRAVENOUS at 09:12

## 2023-06-29 RX ADMIN — ENOXAPARIN SODIUM 40 MG: 40 INJECTION SUBCUTANEOUS at 08:26

## 2023-06-29 RX ADMIN — PIPERACILLIN AND TAZOBACTAM 4.5 G: 4; .5 INJECTION, POWDER, FOR SOLUTION INTRAVENOUS at 02:42

## 2023-06-29 ASSESSMENT — ACTIVITIES OF DAILY LIVING (ADL)
ADLS_ACUITY_SCORE: 24

## 2023-06-29 NOTE — PROGRESS NOTES
"  Interventional Radiology - Progress Note  Inpatient - [facility]  6/29/2023    S:  Pt found resting in bed. Generally tolerating the drain well, though has some internal abdominal discomfort    Flushing: Flush the drain with 10cc saline Qshift. Please make sure to subtract the volume of the flush prior to recording output  Antibiotic: Per gen surg   Culture:  Collected 6/27/2023  9:47 AM      Status: Preliminary result      Visible to patient: No (not released)      Dx: Perforation bowel (H)     Specimen Information: Abdomen; Aspirate    0 Result Notes  Culture 4+ Enterobacter cloacae complex Abnormal        4+ Pseudomonas aeruginosa Abnormal        4+ Escherichia coli Abnormal        3+ Morganella morganii Abnormal        2+ Yeast Abnormal        4+ Normal kimberly         O:  BP (!) 163/84 (BP Location: Left arm)   Pulse 102   Temp 98.6  F (37  C) (Oral)   Resp 16   Ht 2.032 m (6' 8\")   Wt (!) 181.4 kg (400 lb)   SpO2 95%   BMI 43.94 kg/m    General:  Stable.  In no acute distress.    Neuro:  A&O x 3. Moves all extremities equally.  Resp:  breathing unlabored on room air.  Abdomen:  Soft, non-distended, drain in RLQ draining brown/tan fluid  Skin:  Without excoriations, ecchymosis, erythema, lesions or open sores on visible skin.  MSK:  No gross motor weakness.  Sensation intact.  Proprioception intact.    IMAGING:  EXAM:  1. PERCUTANEOUS DRAIN PLACEMENT LEFT ABDOMINAL ABSCESS  2. CT GUIDANCE  3. CONSCIOUS SEDATION  DATE/TIME: 6/27/2023 9:56 AM     INDICATION: Abdominal abscess  TECHNIQUE: Dose reduction techniques were used.     PROCEDURE: Informed consent obtained. Site marked. Prior images  reviewed. Required items made available. Patient identity confirmed  verbally and with arm band. Patient reevaluated immediately before  administering sedation. Universal protocol was followed. Time out  performed. The site was prepped and draped in sterile fashion. 10 mL  of 1% lidocaine was infused into the " local soft tissues. Using  standard technique and under direct CT guidance, a 12 Arabic drainage  catheter was inserted into the fluid collection.       SPECIMEN: 100 mL of malodorous, brown cloudy fluid was aspirated and  sent to lab for cultures and Gram stain.     BLOOD LOSS: Minimal.     The patient tolerated the procedure well. No immediate complications.     RADIOLOGIC SUPERVISION AND INTERPRETATION:   CT GUIDANCE: Images demonstrate the needle and subsequent catheter to  be in good position.     SEDATION: Versed 1 mg. Fentanyl 50 mcg. The procedure was performed  with administration intravenous conscious sedation with appropriate  preoperative, intraoperative, and postoperative evaluation.     15 minutes of supervised face to face conscious sedation time was  provided by a radiology nurse under my direct supervision.                                                                      IMPRESSION:  1.  Successful CT-guided drain placement into intra-abdominal abscess.     ARNIE NOBLE MD     LABS:  CBC RESULTS: Recent Labs   Lab Test 06/29/23  0757   WBC 10.5   RBC 4.07*   HGB 11.4*   HCT 35.5*   MCV 87   MCH 28.0   MCHC 32.1   RDW 13.9         Drain Outputs (in mL):  6/27 115   6/28 85   6/29* 15*               A:  27 year old male with medical history significant for Morbid obesity who was admitted 6/20/23 due to suspected perforated bowel S/P laparoscopic abdominal exploration converted to open with appendectomy, debridement of small bowel mesenteric abscess and lysis of adhesions S/P radiology abdominal drain placement 6/27 by Dr. Noble    P:    - Continue to follow drain outputs.  - Continue present drain cares. Continue drain to OUSMANE bulb drainage.  - Antibiotics per general surgery.   - Drain care education provided to patient. All questions answered.   - Drain discharge instructions entered into D/C navigator.  - Patient to flush drain with 10mL NS daily upon discharge. NS flushes ordered in  D/C navigator.  - OR clinic RN will follow/pt drain at discharge and arrange appropriate follow-up (her contact info is in discharge info if needed)  - IR will continue to follow loosely while inpatient. Please contact IR with drain related questions or concerns.      Total time spent on the date of the encounter is 25 minutes, including time spent counseling the patient, performing a medically appropriate evaluation, reviewing prior medical history, ordering medications and tests, documenting clinical information in the medical record, and communication of results.      Melba Pierce PA-C  Interventional Radiology  [facility] PA desk phone *[phone]

## 2023-06-29 NOTE — PROGRESS NOTES
Pt is up independently and voiding appropriately. Pain is being controlled with oral pain medication. Pt tolerates diet. Pt will discharge home today with OUSMANE drain.    Pt and wife were given instructions on care for drain. Supplies will be sent home with pt at discharge.     Medications have been reviewed and all questions by pt and wife have been answered.

## 2023-06-29 NOTE — PROGRESS NOTES
[phone]  POD#8 exploratory laparoscopy converted to open with appendectomy. Patient AOX4. VSS on RA. Tachycardic/HTN.Pt ambulating Independent. Denies pain on shift.Denies N/V.Incision/lap sites GENARO/staples. OUSMANE drain tan/purulent output.Voiding spontaneously in bathroom. BS audible/No BM on shift, passing flatus. Diet low fiber. Continue to monitor. Plan for discharge 6/29.

## 2023-06-29 NOTE — PROGRESS NOTES
Hospitalist       Provider Role Specialty From [facility]    Hospitalist Progress Note    Date of Service (when I saw the patient): 06/29/2023    Assessment & Plan   Tej Chatman is a 27 year old male with medical history significant for Morbid obesity who was admitted 6/20/23 due to suspected perforated bowel S/P laparoscopic abdominal exploration converted to open with appendectomy, debridement of small bowel mesenteric abscess and lysis of adhesions.        Hospitalist consult has been requested on POD #1 medical management. .    Perforated bowel  Small bowel mesenteric abscess involving mid small bowel, appendix and omentum   S/p laparoscopic abdominal exploration converted to open with appendectomy, debridement of small bowel mesenteric abscess and lysis of adhesions  New fluid collection on CTAP 6/26 - c/f abscess    - Afebrile. Leukocytosis resolved. On zosyn (6/21-), likely needs 14 days total of antibiotics.   - Can do Cipro or Levaquin 500mg BID/Flagyl 500mg BID and Fluconazole until 7/4/2023 on discharge.   - DCed Vanc (6/27-6/28)  - s/p surgery  - IR consulted, placed a third drain 6/27   - follow aspirate cultures from abdomen ---> Gram negative bacilli and gram positive cocci seen on gram stain      Sepsis 2/2 ^   Tachycardia/Low grade fever  Positive blood cultures, gram positive cocci (6/27)  Has several reasons to be tachy including stress (surgery, hospitalization), infection, pain.   EKG with sinus tachycardia. Febrile to 100.4  - CT chest w/o PE.  - Monitor on telemetry.     - f/u urine culture, blood culture (ordered by primary service 6/26), repeat CTAP as per above  - c/w Zosyn (6/21-)  - follow aspirate cultures from abdomen ---> Gram negative bacilli and gram positive cocci seen on gram stain     GPC Bacteremia - coag neg staph, likely contaminant  - s/p 1 dose of Vanc (6/27-6/28)  - f/u repeat blood cultures ordered 6/27  - continue to monitor on just  zosyn     Severe obesity   - Will need to establish care with a PCP.    - Monitor O2 saturations.    - [place] benefit from outpatient sleep study to assess for LUBNA (smores)..       Hyperglycemia  Prediabetes  HGB A1c checked ant at 5.7%.  Patient received IV dexamethasone intra-op and could contribute to hyperglycemia.    - Low intensity sliding scale insulin initiated.    - Nutrition consult.    - SW/CM consult requested to assist with establishing patient with PCP.       Hypocalcemia  Could be secondary to aggressive fluids.    - Order ionized calcium level.    - BMP monitoring.     Alcohol use  Patient indicated that he binge drinks.  He does not consume alcohol on a daily basis.  His last drink was Saturday night.    - Monitor.       Tobacco/nicotine use D/O  Patient reported that he vapes quite heavily throughout the day.    - Nicoderm patch.          DVT Prophylaxis: Defer to primary service.    Disp: per primary team.  Needs PCP.      Medicine will continue to follow with you.    Melania Cartwright MD    Interval History   No NVD  Feeling better  Home today        Physical Exam   Temp: 98.6  F (37  C) Temp src: Oral BP: (!) 163/84 Pulse: 102   Resp: 16 SpO2: 95 % O2 Device: None (Room air)    Vitals:    06/20/23 1915   Weight: (!) 181.4 kg (400 lb)     Vital Signs with Ranges  Temp:  [98.4  F (36.9  C)-99  F (37.2  C)] 98.6  F (37  C)  Pulse:  [102-108] 102  Resp:  [16-18] 16  BP: (142-163)/(84-94) 163/84  SpO2:  [93 %-95 %] 95 %  I/O last 3 completed shifts:  In: 840 [P.O.:840]  Out: 1580 [Urine:1500; Drains:80]    Constitutional: Appears comfortable. Obese.  Respiratory: CTAB.  Cardiovascular: RRR.  GI: post-surgical clips. Drain in place.  Skin/Integumen: no peripheral edema.  Other:      Medications       amoxicillin-clavulanate  1 tablet Oral Q12H [facility] (08/20)     cyclobenzaprine  10 mg Oral TID     enoxaparin ANTICOAGULANT  40 mg Subcutaneous Q12H     insulin aspart  1-4 Units Subcutaneous Q4H      nicotine  1 patch Transdermal Daily     nicotine   Transdermal Q8H     polyethylene glycol  17 g Oral Daily     senna-docusate  1 tablet Oral BID    Or     senna-docusate  2 tablet Oral BID     sodium chloride (PF)  10 mL Irrigation Q8H     sodium chloride (PF)  3 mL Intracatheter Q8H       Data   Recent Labs   Lab 06/29/23  0757 06/29/23  0608 06/29/23  0157 06/28/23  2116 06/28/23  1712 06/28/23  1226 06/28/23  1113 06/28/23  0731 06/27/23  1038 06/27/23  0700 06/23/23  1046 06/23/23  0737   WBC 10.5  --   --   --   --  10.4  --   --   --  13.7*   < > 15.4*   HGB 11.4*  --   --   --   --  12.3*  --   --   --  11.1*   < > 11.6*   MCV 87  --   --   --   --  87  --   --   --  87   < > 91     --   --   --   --  475*  --   --   --  495*  495*   < > 390   NA  --   --   --   --   --   --   --   --   --   --   --  138   POTASSIUM  --   --   --   --   --   --   --   --   --   --   --  4.1   CHLORIDE  --   --   --   --   --   --   --   --   --   --   --  104   CO2  --   --   --   --   --   --   --   --   --   --   --  24   BUN  --   --   --   --   --   --   --   --   --   --   --  6.2   CR  --   --   --   --   --   --   --  0.69  --   --   --  0.76   ANIONGAP  --   --   --   --   --   --   --   --   --   --   --  10   TEMO  --   --   --   --   --   --   --   --   --   --   --  7.7*   GLC  --  91 90 85   < >  --    < >  --    < >  --    < > 146*    < > = values in this interval not displayed.       No results found for this or any previous visit (from the past 24 hour(s)).

## 2023-06-29 NOTE — DISCHARGE INSTRUCTIONS
[facility]  Discharge Instructions: Post-Operative Abdominal Surgery    ACTIVITY  Expect to feel tired after your surgery.  This will gradually resolve.    Take frequent, short walks and increase your activity gradually.    Avoid strenuous physical activity or heavy lifting greater than 15 lbs. for 4 more weeks.  You may climb stairs.    You may drive without restrictions when you are not using any prescription pain medication and feel comfortable in a car.  You may return to work/school when you are comfortable without any prescription pain medication.  You may wear an abdominal binder for comfort for 2-3 weeks from your surgery.  You can wash the abdominal binder and dry it on low heat in the dryer.    WOUND CARE  You may shower, but do not soak your incision(s) in a tub or pool for 2 more weeks.  Pat your incisions dry after your shower and leave them open to air.  Re-apply dressing (Band-Aids or gauze/tape) as needed for comfort or drainage.  Your staples will be removed at your next office visit.  Do not apply any lotions, creams, or ointments to your incisions.  A ridge under your incisions is normal and will gradually resolve.  Change the dry dressings over your drain sites at least once daily. Once these are no longer draining fluid, you can stop covering them.    DIET  Stay on a low fiber diet until your follow up appointment.  Avoid heavy, spicy, greasy meals and gas forming foods, such as cabbage, broccoli, and onions.    You may find your appetite to be diminished briefly after surgery.  You may take nutritional supplement shakes if you are able.   Drink plenty of fluids to stay hydrated.    PAIN  Expect some tenderness and discomfort at the incision site(s).  Use the prescribed pain medication at your discretion.  Expect gradual resolution of your pain over several days.  You may take ibuprofen with food (unless you have been told not to) or acetaminophen/Tylenol instead of  or in addition to your prescribed pain medication.    Do not drink alcohol or drive while you are taking pain medications.  You may apply ice to your incisions in 20 minute intervals as needed for the next 24-48 hours.  After that time, consider switching to heat if you prefer.    EXPECTATIONS  Pain medications can cause constipation.  Limit use when possible.  Take an over the counter or prescribed stool softener/stimulant, such as Senna-Docusate, 1-2 times a day with plenty of water.  You may take a mild over the counter laxative, such as Miralax or a Dulcolax suppository, as needed.  You may discontinue these medications once you are having regular bowel movements and/or are no longer taking your narcotic pain medication.  If you had laparoscopic surgery, you may have shoulder or upper back discomfort due to the gas used in surgery.  This is temporary and should resolve in 48-72 hours after the surgery.  Short, frequent walks may help with this.    RETURN APPOINTMENT  You are scheduled for a postoperative appointment in the surgery clinic next Wednesday 7/5 at 11:30AM. Please arrive at least 15 minutes prior to your appointment to check in and fill out any necessary paperwork.   Our clinic's name [facility]. The address is [address] [place] [address], [address], [place], [place], [address]. Call us at [phone] with any questions or if you need directions to your appointment.    CALL OUR OFFICE [phone] IF YOU HAVE:   Chills or fever above 101  F.  Increased redness, warmth, or drainage at your incisions.  Significant bleeding.  Pain not relieved by your pain medication or rest.  Increasing pain after the first 48 hours.  Any other concerns or questions.

## 2023-06-29 NOTE — PROGRESS NOTES
Care Management Discharge Note    Discharge Date: 06/29/2023       Discharge Disposition: Home    Discharge Services: None    Discharge DME: None    Discharge Transportation: family or friend will provide    Private pay costs discussed: patient is classified as self pay for this visit. Financial counselors screened patient for any eligibility and patient in non eligible    Does the patient's insurance plan have a 3 day qualifying hospital stay waiver?  No    PAS Confirmation Code:    Patient/family educated on Medicare website which has current facility and service quality ratings:      Education Provided on the Discharge Plan: Yes  Persons Notified of Discharge Plans: patient, significant other, RN   Patient/Family in Agreement with the Plan: yes    Handoff Referral Completed: Yes    Additional Information:  Writer discussed self pay status with interventional radiology RN for future scheduling of imaging and follow up..   Re discussed with financial counselors and patient remains non eligible for assistance and will be self pay.     Rescheduled PCP follow up for next week per patient preference.       Meghna Parikh RN   [facility]   Phone [phone]

## 2023-06-29 NOTE — PROGRESS NOTES
"General Surgery Progress Note    Admission Date: 6/20/2023  Today's Date: 6/29/2023         Assessment:      Tej Chatman is a 27 year old male who presented with intra-abdominal free air, now s/p exploratory laparoscopy converted to laparotomy with appendectomy, abdominal lavage, and drain placement. No definitive source of perforation identified. Moderate amount of cloudy fluid throughout abdomen. POD 8.     - Underwent CT-guided drain placement into intra-abdominal abscess 6/27  - Blood cultures positive for gram-positive cocci (contaminant?). Repeat cultures drawn         Plan:   ##ADDENDUM: Discussed with hospitalist. Plan to transition to Levaquin+Flagyl+Diflucan for 5 more days at discharge. Cancel ID consult.    - Discharge home later today. Instructions reviewed, questions answered. Patient will follow-up next week in surgical clinic for recheck and staple removal  - Continue radiology drain. Requested IR provider see patient today to provide education on flushing, drain cares, and coordinate timing for outpatient imaging and drain removal  - Surgical drains removed, change dressings daily. OK to shower.  - Continue senna-docusate and miralax for bowel regimen. Scheduled flexeril, oxy prn  - Encourage aggressive IS use and activity. Ambulate at least QID. Lovenox BID for DVT ppx while here  - Social work consulted, appreciate input  - Medical management per hospitalist. Appreciate input regarding blood pressure prior to discharge    Dispo: home later today, cleared by hospitalist        Interval History:   Afebrile, vitals stable with heart rate low 100s. Remains hypertensive at times. Tej is overall feeling well, very eager to go home. He is up walking well, no nausea, tolerating diet. Pain mild and controlled with meds.          Physical Exam:   BP (!) 163/84 (BP Location: Left arm)   Pulse 102   Temp 98.6  F (37  C) (Oral)   Resp 16   Ht 2.032 m (6' 8\")   Wt (!) 181.4 kg (400 lb)   SpO2 " 95%   BMI 43.94 kg/m    I/O last 3 completed shifts:  In: 840 [P.O.:840]  Out: 1580 [Urine:1500; Drains:80]  General: NAD, pleasant, alert and oriented x3  Respiratory: non-labored breathing  Abdomen: soft, no significant distention, mild appropriate tenderness throughout. Staples in place without erythema or drainage. Previous drain sites now with dry dressings in place. IR drain with cloudy brown fluid.   Extremities: no lower extremity edema, no calf tenderness    LABS:  Recent Labs   Lab Test 06/28/23  1226 06/27/23  0700 06/26/23  0712   WBC 10.4 13.7* 16.0*   HGB 12.3* 11.1* 11.8*   MCV 87 87 86   * 495*  495* 476*     -------------------------------    Linh Watt PA-C  [facility]  [phone]

## 2023-06-30 LAB
BACTERIA ASPIRATE CULT: ABNORMAL

## 2023-07-01 ENCOUNTER — PATIENT OUTREACH (OUTPATIENT)
Dept: CARE COORDINATION | Facility: CLINIC | Age: 28
End: 2023-07-01

## 2023-07-01 LAB — BACTERIA BLD CULT: NO GROWTH

## 2023-07-01 NOTE — PROGRESS NOTES
[facility] Resource Center Contact  [facility]/Voicemail     Clinical Data: Transitional Care Management Outreach     Outreach attempted x 2.  Left message on patient's voicemail, providing [facility]'s 24/7 scheduling and nurse triage phone number [phone] ([phone]) for questions/concerns and/or to schedule an appt with an [facility] provider, if they do not have a PCP.      Plan:  [facility] will do no further outreaches at this time.       Neelima Alvarez  Community Health Worker  [facility], [facility]  [phone]      *Connected Care Resource Team does NOT follow patient ongoing. Referrals are identified based on internal discharge reports and the outreach is to ensure patient has an understanding of their discharge instructions.

## 2023-07-02 LAB
BACTERIA BLD CULT: NO GROWTH
BACTERIA BLD CULT: NO GROWTH

## 2023-07-05 ENCOUNTER — OFFICE VISIT (OUTPATIENT)
Dept: SURGERY | Facility: CLINIC | Age: 28
End: 2023-07-05

## 2023-07-05 VITALS — TEMPERATURE: 97.9 F

## 2023-07-05 DIAGNOSIS — R19.8 PERFORATED VISCUS: ICD-10-CM

## 2023-07-05 DIAGNOSIS — Z09 SURGERY FOLLOW-UP EXAMINATION: Primary | ICD-10-CM

## 2023-07-05 PROCEDURE — 99024 POSTOP FOLLOW-UP VISIT: CPT

## 2023-07-05 RX ORDER — CYCLOBENZAPRINE HCL 10 MG
5-10 TABLET ORAL 3 TIMES DAILY PRN
Qty: 12 TABLET | Refills: 0 | Status: SHIPPED | OUTPATIENT
Start: 2023-07-05 | End: 2024-05-30

## 2023-07-05 RX ORDER — OXYCODONE HYDROCHLORIDE 5 MG/1
5 TABLET ORAL EVERY 6 HOURS PRN
Qty: 12 TABLET | Refills: 0 | Status: SHIPPED | OUTPATIENT
Start: 2023-07-05 | End: 2024-05-30

## 2023-07-05 NOTE — PROGRESS NOTES
[facility] Office Visit Note    Subjective: Tej Chatman is here for his first postoperative visit.  He presented to the ED on 6/20 with intra-abdominal free air, now s/p exploratory laparoscopy converted to laparotomy with appendectomy, abdominal lavage, and drain placement.  No definitive source of perforation was identified but there was a moderate amount of cloudy fluid throughout abdomen.  He underwent CT-guided drain placement into intra-abdominal abscess 6/27.  He has not had IR follow up yet and states he's been having less than 50 ml of purulent drainage from drain since discharge.  Drainage has not seemed to be slowing down.    Today he tells me he is doing well.  Each day he has felt better since discharge from the hospital.  He took his last day of antibiotic today.  Tej has been taking his temperature regularly since leaving the hospital and he has been afebrile.  He endorses pain only at his drain site which is improving.  He does have some low back pain at night which is why he has been taking an oxycodone and flexeril each day.  He is eating a normal diet and his bowels are regular.  The patient states he is slowly resuming normal activity and moving around better.  The patient denies fever/chills, n/v/d, or wound concerns.      Objective:  PHYSICAL EXAM:  GENERAL: VS reviewed, alert, oriented, no acute distress  LUNGS: Normal respiratory effort, no wheezing  ABDOMEN:  Soft, nontender throughout to light and deep palpation, non-distended, OUSMANE drain with 25 ml of purulent murky drainage  INCISION: Staples in place and incision is clean, dry, and intact except small superficial wound to inferior pole of incision.  No surrounding erythema.  EXTREMITIES: Moving all extremities, no gross deformities, well perfused, no lower extremity edema or tenderness  NEUROLOGICAL: Grossly non-focal, mood & affect appropriate    Assessment and Plan:  2 weeks S/P exploratory laparoscopy converted to  laparotomy with appendectomy, abdominal lavage, and drain placement   - Patient is doing well today, however, still having quite a bit of purulent drainage from IR drain.  I sent a message to IR team to arrange follow up with CT scan.  Greatly appreciate their assistance.  - Staples removed and incisions are healing well, continue to keep clean and dry.  Avoid submerging/baths for 2 weeks.  - Advance activity as tolerated, no heavy lifting > 20 lbs or strenuous exercise x 2 weeks.   - Refill sent for oxycodone and flexeril.  He should continue to decrease use and take senokot BID as needed.  - Monitor for new symptoms, worsening abdominal pain, nausea/vomiting, and fever/chills, if any of these occur he should call our office or go to the ED.  He can otherwise follow up with our clinic as needed.    Shoshana Colunga PA-C  Please route or send letter to:  Primary Care Provider (PCP)    30 minutes spent on date of the encounter doing patient visit, chart review, and documentation.

## 2023-07-07 ENCOUNTER — TELEPHONE (OUTPATIENT)
Dept: FAMILY MEDICINE | Facility: CLINIC | Age: 28
End: 2023-07-07

## 2023-07-07 ENCOUNTER — TELEPHONE (OUTPATIENT)
Dept: INTERVENTIONAL RADIOLOGY/VASCULAR | Facility: CLINIC | Age: 28
End: 2023-07-07

## 2023-07-07 ENCOUNTER — OFFICE VISIT (OUTPATIENT)
Dept: FAMILY MEDICINE | Facility: CLINIC | Age: 28
End: 2023-07-07

## 2023-07-07 ENCOUNTER — HOSPITAL ENCOUNTER (OUTPATIENT)
Dept: CT IMAGING | Facility: CLINIC | Age: 28
Discharge: HOME | End: 2023-07-07
Attending: PHYSICIAN ASSISTANT | Admitting: PHYSICIAN ASSISTANT

## 2023-07-07 VITALS
HEIGHT: 78 IN | RESPIRATION RATE: 18 BRPM | DIASTOLIC BLOOD PRESSURE: 85 MMHG | SYSTOLIC BLOOD PRESSURE: 124 MMHG | BODY MASS INDEX: 36.45 KG/M2 | HEART RATE: 100 BPM | OXYGEN SATURATION: 93 % | WEIGHT: 315 LBS | TEMPERATURE: 96.8 F

## 2023-07-07 DIAGNOSIS — K63.1 PERFORATION BOWEL (H): ICD-10-CM

## 2023-07-07 DIAGNOSIS — Z98.890 S/P EXPLORATORY LAPAROTOMY: ICD-10-CM

## 2023-07-07 DIAGNOSIS — R19.8 PERFORATED VISCUS: ICD-10-CM

## 2023-07-07 DIAGNOSIS — Z09 HOSPITAL DISCHARGE FOLLOW-UP: Primary | ICD-10-CM

## 2023-07-07 LAB
ANION GAP SERPL CALCULATED.3IONS-SCNC: 14 MMOL/L (ref 7–15)
BASOPHILS # BLD AUTO: 0 10E3/UL (ref 0–0.2)
BASOPHILS NFR BLD AUTO: 0 %
BUN SERPL-MCNC: 6.2 MG/DL (ref 6–20)
CALCIUM SERPL-MCNC: 9.5 MG/DL (ref 8.6–10)
CHLORIDE SERPL-SCNC: 102 MMOL/L (ref 98–107)
CREAT SERPL-MCNC: 0.73 MG/DL (ref 0.67–1.17)
DEPRECATED HCO3 PLAS-SCNC: 21 MMOL/L (ref 22–29)
EOSINOPHIL # BLD AUTO: 0.1 10E3/UL (ref 0–0.7)
EOSINOPHIL NFR BLD AUTO: 1 %
ERYTHROCYTE [DISTWIDTH] IN BLOOD BY AUTOMATED COUNT: 13.9 % (ref 10–15)
GFR SERPL CREATININE-BSD FRML MDRD: >90 ML/MIN/1.73M2
GLUCOSE SERPL-MCNC: 120 MG/DL (ref 70–99)
HCT VFR BLD AUTO: 43.4 % (ref 40–53)
HGB BLD-MCNC: 13.6 G/DL (ref 13.3–17.7)
IMM GRANULOCYTES # BLD: 0.1 10E3/UL
IMM GRANULOCYTES NFR BLD: 1 %
LYMPHOCYTES # BLD AUTO: 1.9 10E3/UL (ref 0.8–5.3)
LYMPHOCYTES NFR BLD AUTO: 13 %
MCH RBC QN AUTO: 27.4 PG (ref 26.5–33)
MCHC RBC AUTO-ENTMCNC: 31.3 G/DL (ref 31.5–36.5)
MCV RBC AUTO: 88 FL (ref 78–100)
MONOCYTES # BLD AUTO: 1.2 10E3/UL (ref 0–1.3)
MONOCYTES NFR BLD AUTO: 8 %
NEUTROPHILS # BLD AUTO: 11.2 10E3/UL (ref 1.6–8.3)
NEUTROPHILS NFR BLD AUTO: 77 %
NRBC # BLD AUTO: 0 10E3/UL
NRBC BLD AUTO-RTO: 0 /100
PLATELET # BLD AUTO: 662 10E3/UL (ref 150–450)
POTASSIUM SERPL-SCNC: 4.5 MMOL/L (ref 3.4–5.3)
RBC # BLD AUTO: 4.96 10E6/UL (ref 4.4–5.9)
SODIUM SERPL-SCNC: 137 MMOL/L (ref 136–145)
WBC # BLD AUTO: 14.5 10E3/UL (ref 4–11)

## 2023-07-07 PROCEDURE — 74177 CT ABD & PELVIS W/CONTRAST: CPT

## 2023-07-07 PROCEDURE — 85025 COMPLETE CBC W/AUTO DIFF WBC: CPT | Performed by: PHYSICIAN ASSISTANT

## 2023-07-07 PROCEDURE — 36415 COLL VENOUS BLD VENIPUNCTURE: CPT | Performed by: PHYSICIAN ASSISTANT

## 2023-07-07 PROCEDURE — 250N000009 HC RX 250: Performed by: PHYSICIAN ASSISTANT

## 2023-07-07 PROCEDURE — 99495 TRANSJ CARE MGMT MOD F2F 14D: CPT | Performed by: PHYSICIAN ASSISTANT

## 2023-07-07 PROCEDURE — 250N000011 HC RX IP 250 OP 636: Performed by: PHYSICIAN ASSISTANT

## 2023-07-07 PROCEDURE — 80048 BASIC METABOLIC PNL TOTAL CA: CPT | Performed by: PHYSICIAN ASSISTANT

## 2023-07-07 RX ORDER — IOPAMIDOL 755 MG/ML
500 INJECTION, SOLUTION INTRAVASCULAR ONCE
Status: COMPLETED | OUTPATIENT
Start: 2023-07-07 | End: 2023-07-07

## 2023-07-07 RX ADMIN — SODIUM CHLORIDE 65 ML: 9 INJECTION, SOLUTION INTRAVENOUS at 16:19

## 2023-07-07 RX ADMIN — IOPAMIDOL 100 ML: 755 INJECTION, SOLUTION INTRAVENOUS at 16:19

## 2023-07-07 ASSESSMENT — PAIN SCALES - GENERAL: PAINLEVEL: NO PAIN (0)

## 2023-07-07 NOTE — Clinical Note
Tylor Anna,   I saw Tej today in follow-up.  Purulent drainage still persistent from his OUSMANE drain, similar it seems to when you you had seen him. He has been monitoring his temperature at home and did have a momentary bump up to 101.1 last night which more or less self resolved.  Did not have any subjective chills/feel feverish at that time.  Otherwise clinically asymptomatic. Bowels and appetite stable. Nontender ABD. 98.3 today in office. However, increased white count today up to 14. Give that I had planned to get a repeat CT urgently. Any other thoughts?   Best,  Tom [phone]

## 2023-07-07 NOTE — TELEPHONE ENCOUNTER
Long Hernandez PA-C  Cs Triage Im 27 minutes ago (2:16 PM)     MG  Can we contact imaging to get a stat CT ABD pelvis on this patient please? Called patient and given brief episode of a fever last night and increase white count want to reassess. He is comfortable with this plan and knows signs to go to ER      Called imaging scheduling   Unable to work pt in today at [place]   conferenced pt into call to see if he is okay with alternative location   They were able to get patient into [place] this afternoon for CT     HENOK SARMIENTO, Triage RN  [facility]

## 2023-07-07 NOTE — PROGRESS NOTES
"  Assessment & Plan     Hospital discharge follow-up  S/P exploratory laparotomy  Perforated viscus  Perforation bowel (H)    Expressed my concern to the patient and to his girlfriend in regards to the brief episode of an elevated temperature while at home.  Happy he had no associated symptoms and is otherwise feeling his usual self is getting better with his drain draining appropriately.  Discussed need for close monitoring.  Check CBC and BMP today.  Sodium flushes refilled.  Provided him the number to call IR and to schedule follow-up CT scan/drain check.  Comfortable with plan.    - CBC with platelets and differential  - sodium chloride, PF, (SALINE FLUSH) 0.9% PF flush  Dispense: 100 mL; Refill: 3  - Basic metabolic panel  (Ca, Cl, CO2, Creat, Gluc, K, Na, BUN)      30 minutes spent by me on the date of the encounter doing chart review, review of test results, interpretation of tests, patient visit and documentation      MED REC REQUIRED  Post Medication Reconciliation Status: discharge medications reconciled and changed, per note/orders  BMI:   Estimated body mass index is 43.07 kg/m  as calculated from the following:    Height as of this encounter: 2 m (6' 6.75\").    Weight as of this encounter: 172.3 kg (379 lb 14.4 oz).   Weight management plan: Discussed healthy diet and exercise guidelines    The likelihood of other entities in the differential is insufficient to justify any further testing for them at this time. This was explained to the patient. The patient was advised that persistent or worsening symptoms would require further evaluation. Patient advised to call the office and if unable to reach to go to the emergency room if they develop any new or worsening symptoms. Expressed understanding and agreement with above stated plan.     NHAN Simon [facility] LORI Burnham is a 27 year old, presenting for the following health issues:  Hospital F/U (Patient here " for hospital follow up for abdominal pain.  Patient hospitalized from 6/20/2023 to 6/29/2023.  Patient does have a Karlos Gonsales drainage tube placed 1 week prior to discharge from hospital.)    Here today with his girlfriend for hospital discharge follow-up.    He is status post exploratory laparoscopy converted to laparotomy with appendectomy abdominal lavage and drain placement.  Met with surgical team on 7/5/2023 and follow-up.  Recommended to follow-up with IR for drain management/follow-up CT scan.  Does not have number to schedule this.    Completed antibiotic therapy.  Using MiraLAX as well as stool softener as needed for constipation.  Was having some lower abdominal cramping so I recommended them to use the MiraLAX/stool softener scheduled.  Cramping often relieved by bowel movement.  Requesting refill on saline flushes.     Of note, he has been keeping a close eye on his temperature since being at home.  Last night noted temperature of 101.1 which lasted for 1 hour.  This gradually resolved.  Took Tylenol as a precaution.  This morning at home was 99.1.  96.3 today without medication on board.  Denies feeling feverish, chills, night sweats, nausea, vomiting, decreased appetite, chest pain, shortness of breath, decreased urinary output.  Staples removed and pain is improving over drain site area.  Has been having approximately 30-40 ml of purulent drainage from his drain since discharge.    Hospital Follow-up Visit:    Hospital/Nursing Home/IP Rehab Facility: [facility]  Date of Admission: 6/20/2023  Date of Discharge: 6/29/2023   Reason(s) for Admission:   Perforated viscus      Was your hospitalization related to COVID-19? No   Problems taking medications regularly:  None  Medication changes since discharge: None  Problems adhering to non-medication therapy:  None    Summary of hospitalization:  [facility] discharge summary reviewed  Diagnostic Tests/Treatments  "reviewed.  Follow up needed: IR drains/surgery.  Other Healthcare Providers Involved in Patient s Care:         None  Update since discharge: improved.       Plan of care communicated with patient             Review of Systems   Constitutional, HEENT, cardiovascular, pulmonary, GI, , musculoskeletal, neuro, skin, endocrine and psych systems are negative, except as otherwise noted.      Objective    /85 (BP Location: Right arm, Patient Position: Sitting, Cuff Size: Adult Large)   Pulse 100   Temp 96.8  F (36  C) (Temporal)   Resp 18   Ht 2 m (6' 6.75\")   Wt (!) 172.3 kg (379 lb 14.4 oz)   SpO2 93%   BMI 43.07 kg/m    Body mass index is 43.07 kg/m .     Allergies   Allergen Reactions     Erythromycin Unknown     Occurred as a baby. Does not remember reaction.     Current Outpatient Medications   Medication Sig Dispense Refill     cyclobenzaprine (FLEXERIL) 10 MG tablet Take 0.5-1 tablets (5-10 mg) by mouth 3 times daily as needed for muscle spasms (pain) 12 tablet 0     oxyCODONE (ROXICODONE) 5 MG tablet Take 1 tablet (5 mg) by mouth every 6 hours as needed for moderate to severe pain 12 tablet 0     polyethylene glycol (MIRALAX) 17 GM/Dose powder Take 17 g by mouth daily as needed for constipation 510 g 0     senna-docusate (SENOKOT-S/PERICOLACE) 8.6-50 MG tablet Take 1-2 tablets by mouth 2 times daily as needed for constipation 20 tablet 0     sodium chloride, PF, (SALINE FLUSH) 0.9% PF flush 10 mLs by Intracatheter route every 24 hours Flush drain daily 100 mL 3     No past medical history on file.  Past Surgical History:   Procedure Laterality Date     LAPAROSCOPY DIAGNOSTIC (GYN) N/A 6/21/2023    Procedure: Exploratory Laparoscopy; Converted to open; Appendectomy; Abdominal Lavage; Drain placement;  Surgeon: Teddy Bruce MD;  Location:  OR       Physical Exam   GENERAL: healthy, alert and no distress  EYES: Eyes grossly normal to inspection, PERRL and conjunctivae and sclerae normal  NECK: no " adenopathy, no asymmetry, masses, or scars  RESP: lungs clear to auscultation - no rales, rhonchi or wheezes  CV: regular rate and rhythm, normal S1 S2, no S3 or S4, no murmur, click or rub, no peripheral edema  ABDOMEN: soft, nontender, no hepatosplenomegaly, no masses and bowel sounds normal.   OUSMANE drain with approximately 20 mL of murky drainage.  Wound sites are well-healing.  No surrounding erythema.    MS: no gross musculoskeletal defects noted, no edema  SKIN: no suspicious lesions or rashes  NEURO: Normal strength and tone, mentation intact and speech normal  PSYCH: mentation appears normal, affect normal/bright

## 2023-07-07 NOTE — TELEPHONE ENCOUNTER
Received a call from Tej today regarding his drain and a follow up visit with IR.     Tej Chatman is a 27 year old male with a h/o obesity, who presented with intra-abdominal free air, s/p exploratory laparoscopy 6/21 converted to laparotomy with appendectomy, abdominal lavage, debridement of small bowel mesenteric abscess, lysis of adhesions and drain placement. There was no definitive source of perforation identified and a moderate amount of cloudy fluid throughout abdomen.      A CT-guided drain was placed into an intra-abdominal abscess on 6/27. Outputs were initially 115 mL, 85 mL 6/28 and 15 mL prior to discharge. Multiple organisms were cultured out of the fluid.     Patient was discharged from the hospital on 6/29/23 with the drain in place, discharge instructions to flush with 10 mL NS once a day and antibiotics.     He is calling today to enquire whether he needed an IR follow up visit. It was explained that he did not need a physical appointment with IR but would follow up with phone calls to monitor outputs. Also he has Annabelle's and my number to call with questions.     Outputs are and have been consistently ~ 50 mL a day after NS subtracted out. He flushes once and sometimes twice a day as needed. He is afebrile and has some drain pain which is to be expected. He mentioned that outputs are starting to smell and he has finished his antibiotics.     Discussed that IR would like to see daily outputs 10 mL or less prior to a follow up CT sinogram. He would need to call surgery about antibiotic questions.     He understood and will call if any concerns. OP IR RN clinician will also call this next week to check his progress.     Thanks, Lucinda [facility] Interventional Radiology CNP ([phone]) (phone [phone])

## 2023-07-07 NOTE — TELEPHONE ENCOUNTER
Call patient with results of CT scan.  Reassured by outcome given collapsed abscess cavity with percutaneous catheter, small residual air fluid cavity remaining, as well as decrease inflammation in the omentum.  No signs of new free air, or new abscess collection which is reassuring.  Reviewed signs symptoms that warrant reevaluation in the meantime.  Appears as though IR will be checking in on him next week and will plan next steps.  Will send to surgical team as FYI.  Encouraged him to reach out if I can help with anything else.  Sent flushes to hospital pharmacy as his pharmacy was currently out of stock.

## 2023-07-07 NOTE — TELEPHONE ENCOUNTER
Patient calling clinic to request lab results. Per chart review, labs have resulted but no provider interpretation was attached, RN relayed this to patient. Patient verbalized understanding and stated he will await provider interpretation of results.     Routing to ordering provider for review.

## 2023-07-07 NOTE — PATIENT INSTRUCTIONS
Annabelle SMITH RN clinician at  or Lucinda SMITH NP at  for questions or concerns about the tube. You can leave a voicemail. We work Monday through Friday [phone] or [phone].

## 2023-07-10 ENCOUNTER — TELEPHONE (OUTPATIENT)
Dept: INTERVENTIONAL RADIOLOGY/VASCULAR | Facility: CLINIC | Age: 28
End: 2023-07-10

## 2023-07-10 NOTE — TELEPHONE ENCOUNTER
Tej had a CT scan last Friday, report below.     Reviewed history and imaging with IR Dr Carter. He noted that the drainage tube is in an air pocket adjacent to a loop of small bowel. Fluid almost resolved. Recommends the need to document communication to the bowel with a CT sinogram. This would become a surgical management of the tube if there is direct communication to the small bowel.     The patient has persistent free air also.     Annabelle, IR RN Clinician will contact patient to schedule CT sinogram this week.     EXAM: CT ABDOMEN PELVIS W CONTRAST  LOCATION: [facility]  DATE: 7/7/2023     INDICATION:  Perforated viscus, S/P exploratory laparotomy, Perforation bowel (H)  COMPARISON: 06/26/2023  TECHNIQUE: CT scan of the abdomen and pelvis was performed following injection of IV contrast. Multiplanar reformats were obtained. Dose reduction techniques were used.  CONTRAST: 100mL Isovue 370     FINDINGS:   LOWER CHEST: Small left pleural effusion with mild bibasilar consolidation.     HEPATOBILIARY: Fatty liver.     PANCREAS: Normal.     SPLEEN: Normal.     ADRENAL GLANDS: Normal.     KIDNEYS/BLADDER: Normal.     BOWEL: Percutaneous catheter within a collapsed abscess cavity left lower abdomen anteriorly. This is essentially resolved with only a small residual air-fluid cavity remaining measuring 4.6 x 0.7 cm. Decreased inflammatory stranding in the omentum. There is slight distention and mild wall thickening and a few loops of small bowel in the left lower abdomen.     LYMPH NODES: Normal.     VASCULATURE: Unremarkable.     PELVIC ORGANS: Normal.     MUSCULOSKELETAL: Normal.                                                                   IMPRESSION: Percutaneous left-sided abdominal drainage catheter in the anterior abdomen with near complete drainage of the previously seen abscess. There is a predominantly air-filled 4.6 x 0.7 cm cavity remaining adjacent to a small bowel loop.  Improved inflammatory stranding in the omentum in this location.    Thanks, Lucinda [facility] Interventional Radiology CNP ([phone]) (phone [phone])

## 2023-07-11 ENCOUNTER — TELEPHONE (OUTPATIENT)
Dept: OTHER | Facility: CLINIC | Age: 28
End: 2023-07-11

## 2023-07-11 NOTE — TELEPHONE ENCOUNTER
Contacted patient, states outputs have been less than 20 ml per 24 hours for the last couple days.  Also, report that the cap to the bulb broke, but is still functions and is holding suction.  Discussed we can proceed with the CT sinogram this week, if need be the OUSMANE can be exchanged for a new bulb.    Gave patient central scheduling number, discussed needs to have sinogram at [facility] between 9 am- 2pm.    Annabelle Marie RN  IR nurse clinician  [phone]

## 2023-07-13 ENCOUNTER — HOSPITAL ENCOUNTER (OUTPATIENT)
Dept: CT IMAGING | Facility: CLINIC | Age: 28
Discharge: HOME | End: 2023-07-13
Attending: NURSE PRACTITIONER | Admitting: NURSE PRACTITIONER

## 2023-07-13 DIAGNOSIS — R19.8 PERFORATED VISCUS: ICD-10-CM

## 2023-07-13 PROCEDURE — 76080 X-RAY EXAM OF FISTULA: CPT

## 2023-07-13 NOTE — PROGRESS NOTES
Interventional Radiology - Progress Note  Outpatient - [facility]  7/13/2023    IR Brief Note    Patient presents today for CT sinogram. Still with about 10mL malodorous output daily. Imaging reviewed with Dr Noble. Sinogram demonstrates direct communication with small bowel. Patient should follow up with surgery for outpatient visit. Patient verbalized understanding of these instructions.    Juan Cisse PA-C  Interventional Radiology  [phone] (IR)  [phone] ([facility] Office)

## 2023-07-17 ENCOUNTER — OFFICE VISIT (OUTPATIENT)
Dept: SURGERY | Facility: CLINIC | Age: 28
End: 2023-07-17

## 2023-07-17 DIAGNOSIS — Z09 SURGERY FOLLOW-UP EXAMINATION: Primary | ICD-10-CM

## 2023-07-24 DIAGNOSIS — R19.8 PERFORATED VISCUS: Primary | ICD-10-CM

## 2023-07-26 ENCOUNTER — TELEPHONE (OUTPATIENT)
Dept: INTERVENTIONAL RADIOLOGY/VASCULAR | Facility: CLINIC | Age: 28
End: 2023-07-26

## 2023-07-26 NOTE — TELEPHONE ENCOUNTER
IR OP Referral was entered by Dr Bruce regarding Tej Chatman. He requested to discuss the case with an IR MD.     Tej Chatman was admitted I June 2023 with a perforated viscus s/p repair. He had a drain placed on 6/27/23 into an abdominal fluid collection with 100 mL initially drained. This is now down to ~ 10 mL of fluid daily. A CT sinogram was done 7/13/23 which shows communication to the bowel.      Dr Bruce plans on most likely a bowel resection surgery in 6-9 months when the patient's bowel is less inflamed but asked if IR could possible downsize or pullback the drain to help with potentially closing the fistula. Dr Farley agreed     CT sinogram ordered and requested to be done the second week in August. Please exchange and possibly downsize     It was requested to schedule at  in CT on a day that Dr Farley is in IR.    Thanks, Lucinda [facility] Interventional Radiology CNP ([phone]) (phone [phone])

## 2023-08-04 NOTE — DISCHARGE SUMMARY
"Discharge Summary    Tej Chatman MRN# [id number]   YOB: 1995 Age: 27 year old     Date of Admission:  6/20/2023  Date of Discharge:  6/29/2023  1:14 PM  Admitting Physician:  Teddy Bruce MD  Discharge Physician:  No att. providers found  Discharging Service:  Surgery     Primary Provider: No Ref-Primary, Physician          Admission Diagnoses:   Perforated viscus [R19.8]          Discharge Diagnosis:     Patient Active Problem List   Diagnosis    Perforated viscus                Discharge Disposition:     Discharged to home           Condition on Discharge:     Discharge condition: Stable   Discharge vitals: Blood pressure (!) 163/84, pulse 102, temperature 98.6  F (37  C), temperature source Oral, resp. rate 16, height 2.032 m (6' 8\"), weight (!) 181.4 kg (400 lb), SpO2 95 %.   Code status on discharge: Full Code           Procedures / Labs / Imaging:   Exploratory Laparotomy and drain placement         Medications Prior to Admission:     No medications prior to admission.             Discharge Medications:     No current facility-administered medications for this encounter.     Current Outpatient Medications   Medication Sig    polyethylene glycol (MIRALAX) 17 GM/Dose powder Take 17 g by mouth daily as needed for constipation    senna-docusate (SENOKOT-S/PERICOLACE) 8.6-50 MG tablet Take 1-2 tablets by mouth 2 times daily as needed for constipation    cyclobenzaprine (FLEXERIL) 10 MG tablet Take 0.5-1 tablets (5-10 mg) by mouth 3 times daily as needed for muscle spasms (pain)    oxyCODONE (ROXICODONE) 5 MG tablet Take 1 tablet (5 mg) by mouth every 6 hours as needed for moderate to severe pain    sodium chloride, PF, (SALINE FLUSH) 0.9% PF flush 10 mLs by Intracatheter route every 24 hours Flush drain daily             Consultations:     Hospitalist  Radiology  Registered Dietician             Brief History of Illness:   Tej Chatman is a 27 year old male who was admitted for abdominal " pain and evidence of small bowel perforation.            Hospital Course:     Patient Active Problem List   Diagnosis    Perforated viscus         Taken to the operating room on the night of admission for exploration.  Perforation was sealed at that time.  Drain was placed and he was cared for with antibiotics and drainage.           Significant Results:     None             Pending Results:     None           Discharge Instructions and Follow-Up:     Discharge diet: Low residue   Discharge activity: Lifting restricted to 20 pounds   Discharge follow-up: Follow up with me in 2-3 weeks   Outpatient therapy: None    Home Care agency: None    Supplies and equipment: None   Lines and drains: Percutaneous abdominal drain    Wound care: None   Other instructions: None

## 2023-08-17 ENCOUNTER — HOSPITAL ENCOUNTER (OUTPATIENT)
Dept: CT IMAGING | Facility: CLINIC | Age: 28
Discharge: HOME | End: 2023-08-17
Attending: SURGERY | Admitting: SURGERY

## 2023-08-17 DIAGNOSIS — R19.8 PERFORATED VISCUS: ICD-10-CM

## 2023-08-17 PROCEDURE — 20501 NJX SINUS TRACT DIAGNOSTIC: CPT

## 2023-08-17 ASSESSMENT — ENCOUNTER SYMPTOMS: FEVER: 1

## 2023-09-05 ENCOUNTER — TELEPHONE (OUTPATIENT)
Dept: OTHER | Facility: CLINIC | Age: 28
End: 2023-09-05

## 2023-09-06 ENCOUNTER — TELEPHONE (OUTPATIENT)
Dept: OTHER | Facility: CLINIC | Age: 28
End: 2023-09-06

## 2023-09-06 DIAGNOSIS — R19.8 PERFORATED VISCUS: Primary | ICD-10-CM

## 2023-09-06 NOTE — TELEPHONE ENCOUNTER
Patient returned phone call.  He states, he has not heard from Dr. Bruce's office.  Still has the drain in, not flushing.  Still decreased outputs, but did have minimal before the last CT sinogram done on 8/16.  Instructed patient to call Dr. Bruce's office to see what the plan is.  Annabelle Marie RN  IR nurse clinician  [phone]

## 2023-09-06 NOTE — TELEPHONE ENCOUNTER
Attempted to call patient.  No answer. He does have an appointment on September 11th with Dr. Bruce    Asked patient to call back to discuss how much output he is having in the drain    Will route to Dr. Bruce so he is aware that patient will be seeing him to discuss plan/next steps    Judy Hart, RN-BSN

## 2023-09-06 NOTE — TELEPHONE ENCOUNTER
Patient returned call.    He reports that the last time he had a measurable amount of output in the drain bulb was August 7th. Since this time, output has been so minimal that he has not been able to measure    He will follow up with Dr. Bruce on 9/11/23 as planned    I will call him if Dr. Bruce is wanting any further imaging/testing done prior to being seen    Patient reports that he sleeps during the day and it is okay to schedule anything that is needed and to then call him with this information. He can make any appointment work.    Judy Hart, RN-BSN

## 2023-09-08 ENCOUNTER — TELEPHONE (OUTPATIENT)
Dept: SURGERY | Facility: CLINIC | Age: 28
End: 2023-09-08

## 2023-09-08 NOTE — TELEPHONE ENCOUNTER
Patient is scheduled to see Dr. Bruce on Monday the 11th    Per Dr. Bruce, patient should have IR drain check for downsizing vs removal    Message has been sent to the IR team regarding this, still waiting response    Left message for patient informing him of plan. Informed him that he can keep the appointment on the 11th with Dr. Bruce for follow up/discussion,  but drain won't be removed until IR checks to see if okay to remove or if downsize recommended    Call back number provided. Informed him he should call with any questions regarding this, or if he wants to cancel his appointment with Dr. Bruce until he sees IR    Judy Hart, VERONICA-BSN

## 2023-09-11 ENCOUNTER — OFFICE VISIT (OUTPATIENT)
Dept: SURGERY | Facility: CLINIC | Age: 28
End: 2023-09-11

## 2023-09-11 DIAGNOSIS — Z09 SURGERY FOLLOW-UP EXAMINATION: Primary | ICD-10-CM

## 2023-09-11 PROCEDURE — 99024 POSTOP FOLLOW-UP VISIT: CPT | Performed by: SURGERY

## 2023-09-12 ENCOUNTER — TELEPHONE (OUTPATIENT)
Dept: OTHER | Facility: CLINIC | Age: 28
End: 2023-09-12

## 2023-09-12 NOTE — TELEPHONE ENCOUNTER
Per Dr. Haile, patient will need CT sinogram.  IR scheduling should be contacting patient.  Also, recommend drain to gravity drainage.  Left VM stating the above.  Annabelle Marie RN  IR nurse clinician  [phone]

## 2023-09-13 NOTE — PROGRESS NOTES
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  During the last tube interrogation communication to the small intestine was noticed.  Since that study, in early August, he reports diminished output from the drain.  He continues to have good bowel function and is tolerating a diet without problems.  He also reports at times feeling flushed but no elevated temperature, he does report some pain near the area of the drain.    On exam his abdomen is soft, scant amount of drainage on the bulb, is turbid/purplish.    Status post abdominal exploration now with enterocutaneous fistula.  We will continue to manage this with percutaneous drainage, we will try to interrogate his drain in the near future and continue to allow for additional healing prior to considering any surgical intervention if it ever becomes necessary.  He understands this plan.    Follow-up in 6 to 8 weeks or pending results from tube check.

## 2023-09-27 ENCOUNTER — TELEPHONE (OUTPATIENT)
Dept: OTHER | Facility: CLINIC | Age: 28
End: 2023-09-27

## 2023-09-27 NOTE — TELEPHONE ENCOUNTER
Contacted patient, discussed with patient. Still needs CT sinogram and follow up with Dr. Bruce  Gave central scheduling number for patient to schedule at his convenience as well reminded patient he needs follow up appt with Dr. Bruce.  Annabelle Marie RN  IR nurse clinician  [phone]

## 2023-10-17 ENCOUNTER — TELEPHONE (OUTPATIENT)
Dept: OTHER | Facility: CLINIC | Age: 28
End: 2023-10-17

## 2023-10-17 NOTE — TELEPHONE ENCOUNTER
Left message VM.  Patient was a no show for CT sinogram.  Needs to reschedule CT. Unsure if drain is still in place?  Annabelle Marie RN  IR nurse clinician  [phone]

## 2024-05-07 ENCOUNTER — HOSPITAL ENCOUNTER (EMERGENCY)
Facility: CLINIC | Age: 29
Discharge: HOME | End: 2024-05-08
Attending: EMERGENCY MEDICINE | Admitting: EMERGENCY MEDICINE

## 2024-05-07 VITALS
HEART RATE: 115 BPM | RESPIRATION RATE: 16 BRPM | SYSTOLIC BLOOD PRESSURE: 151 MMHG | BODY MASS INDEX: 36.45 KG/M2 | TEMPERATURE: 98.2 F | DIASTOLIC BLOOD PRESSURE: 89 MMHG | HEIGHT: 78 IN | WEIGHT: 315 LBS | OXYGEN SATURATION: 100 %

## 2024-05-07 DIAGNOSIS — R10.2 PELVIC PAIN: ICD-10-CM

## 2024-05-07 PROCEDURE — 83690 ASSAY OF LIPASE: CPT | Performed by: EMERGENCY MEDICINE

## 2024-05-07 PROCEDURE — 36415 COLL VENOUS BLD VENIPUNCTURE: CPT | Performed by: EMERGENCY MEDICINE

## 2024-05-07 PROCEDURE — 96361 HYDRATE IV INFUSION ADD-ON: CPT

## 2024-05-07 PROCEDURE — 99285 EMERGENCY DEPT VISIT HI MDM: CPT | Mod: 25

## 2024-05-07 PROCEDURE — 80053 COMPREHEN METABOLIC PANEL: CPT | Performed by: EMERGENCY MEDICINE

## 2024-05-07 PROCEDURE — 96360 HYDRATION IV INFUSION INIT: CPT | Mod: 59

## 2024-05-07 ASSESSMENT — COLUMBIA-SUICIDE SEVERITY RATING SCALE - C-SSRS
6. HAVE YOU EVER DONE ANYTHING, STARTED TO DO ANYTHING, OR PREPARED TO DO ANYTHING TO END YOUR LIFE?: NO
2. HAVE YOU ACTUALLY HAD ANY THOUGHTS OF KILLING YOURSELF IN THE PAST MONTH?: NO
1. IN THE PAST MONTH, HAVE YOU WISHED YOU WERE DEAD OR WISHED YOU COULD GO TO SLEEP AND NOT WAKE UP?: NO

## 2024-05-08 ENCOUNTER — APPOINTMENT (OUTPATIENT)
Dept: CT IMAGING | Facility: CLINIC | Age: 29
End: 2024-05-08
Attending: EMERGENCY MEDICINE

## 2024-05-08 LAB
ALBUMIN SERPL BCG-MCNC: 4.2 G/DL (ref 3.5–5.2)
ALP SERPL-CCNC: 109 U/L (ref 40–150)
ALT SERPL W P-5'-P-CCNC: 30 U/L (ref 0–70)
ANION GAP SERPL CALCULATED.3IONS-SCNC: 18 MMOL/L (ref 7–15)
AST SERPL W P-5'-P-CCNC: 24 U/L (ref 0–45)
BASOPHILS # BLD AUTO: 0 10E3/UL (ref 0–0.2)
BASOPHILS NFR BLD AUTO: 0 %
BILIRUB SERPL-MCNC: 0.4 MG/DL
BUN SERPL-MCNC: 7.1 MG/DL (ref 6–20)
CALCIUM SERPL-MCNC: 9.4 MG/DL (ref 8.6–10)
CHLORIDE SERPL-SCNC: 103 MMOL/L (ref 98–107)
CREAT SERPL-MCNC: 0.69 MG/DL (ref 0.67–1.17)
DEPRECATED HCO3 PLAS-SCNC: 23 MMOL/L (ref 22–29)
EGFRCR SERPLBLD CKD-EPI 2021: >90 ML/MIN/1.73M2
EOSINOPHIL # BLD AUTO: 0 10E3/UL (ref 0–0.7)
EOSINOPHIL NFR BLD AUTO: 0 %
ERYTHROCYTE [DISTWIDTH] IN BLOOD BY AUTOMATED COUNT: 13.2 % (ref 10–15)
GLUCOSE SERPL-MCNC: 91 MG/DL (ref 70–99)
HCT VFR BLD AUTO: 45.8 % (ref 40–53)
HGB BLD-MCNC: 14.9 G/DL (ref 13.3–17.7)
IMM GRANULOCYTES # BLD: 0 10E3/UL
IMM GRANULOCYTES NFR BLD: 0 %
LIPASE SERPL-CCNC: 17 U/L (ref 13–60)
LYMPHOCYTES # BLD AUTO: 2.2 10E3/UL (ref 0.8–5.3)
LYMPHOCYTES NFR BLD AUTO: 20 %
MCH RBC QN AUTO: 26.9 PG (ref 26.5–33)
MCHC RBC AUTO-ENTMCNC: 32.5 G/DL (ref 31.5–36.5)
MCV RBC AUTO: 83 FL (ref 78–100)
MONOCYTES # BLD AUTO: 0.6 10E3/UL (ref 0–1.3)
MONOCYTES NFR BLD AUTO: 5 %
NEUTROPHILS # BLD AUTO: 8.1 10E3/UL (ref 1.6–8.3)
NEUTROPHILS NFR BLD AUTO: 75 %
NRBC # BLD AUTO: 0 10E3/UL
NRBC BLD AUTO-RTO: 0 /100
PLATELET # BLD AUTO: 305 10E3/UL (ref 150–450)
POTASSIUM SERPL-SCNC: 4 MMOL/L (ref 3.4–5.3)
PROT SERPL-MCNC: 8 G/DL (ref 6.4–8.3)
RBC # BLD AUTO: 5.54 10E6/UL (ref 4.4–5.9)
SODIUM SERPL-SCNC: 144 MMOL/L (ref 135–145)
WBC # BLD AUTO: 11.1 10E3/UL (ref 4–11)

## 2024-05-08 PROCEDURE — 85025 COMPLETE CBC W/AUTO DIFF WBC: CPT | Performed by: EMERGENCY MEDICINE

## 2024-05-08 PROCEDURE — 74177 CT ABD & PELVIS W/CONTRAST: CPT

## 2024-05-08 PROCEDURE — 36415 COLL VENOUS BLD VENIPUNCTURE: CPT | Performed by: EMERGENCY MEDICINE

## 2024-05-08 PROCEDURE — 250N000011 HC RX IP 250 OP 636: Performed by: EMERGENCY MEDICINE

## 2024-05-08 PROCEDURE — 250N000009 HC RX 250: Performed by: EMERGENCY MEDICINE

## 2024-05-08 PROCEDURE — 258N000003 HC RX IP 258 OP 636: Performed by: EMERGENCY MEDICINE

## 2024-05-08 RX ORDER — IOPAMIDOL 755 MG/ML
135 INJECTION, SOLUTION INTRAVASCULAR ONCE
Status: COMPLETED | OUTPATIENT
Start: 2024-05-08 | End: 2024-05-08

## 2024-05-08 RX ADMIN — IOPAMIDOL 135 ML: 755 INJECTION, SOLUTION INTRAVENOUS at 01:28

## 2024-05-08 RX ADMIN — SODIUM CHLORIDE 79 ML: 9 INJECTION, SOLUTION INTRAVENOUS at 01:28

## 2024-05-08 RX ADMIN — SODIUM CHLORIDE 1000 ML: 9 INJECTION, SOLUTION INTRAVENOUS at 00:49

## 2024-05-08 ASSESSMENT — ACTIVITIES OF DAILY LIVING (ADL)
ADLS_ACUITY_SCORE: 37
ADLS_ACUITY_SCORE: 37

## 2024-05-08 NOTE — DISCHARGE INSTRUCTIONS
You need to call Dr. Bruce first thing tomorrow.  It looks like this drain likely can just be removed but please review with them to make sure that is the most appropriate course of action.  They may want to arrange follow-up in order to facilitate this.  Return with increased pain, fever, or any other new or concerning symptoms.

## 2024-05-08 NOTE — ED TRIAGE NOTES
Patient states low abdominal pain, pelvic area for 2 days.  Pain similar to when he had a bowel perforation last week.  OUSMANE drain to left abdomen.  Pain more to left lower but is also everywhere.  A little blood drainage from OUSMANE.     Triage Assessment (Adult)       Row Name 05/07/24 4610          Triage Assessment    Airway WDL WDL        Respiratory WDL    Respiratory WDL WDL        Cardiac WDL    Cardiac WDL WDL        Peripheral/Neurovascular WDL    Peripheral Neurovascular WDL WDL        Cognitive/Neuro/Behavioral WDL    Cognitive/Neuro/Behavioral WDL WDL

## 2024-05-08 NOTE — ED PROVIDER NOTES
"    History     Chief Complaint:  Abdominal Pain       The history is provided by the patient and medical records.      Tej Chatman is a 28 year old male who had a viscus perforation and intra-abdominal abscess for which he had a drain placed 6/27/2023 presenting with his significant other for evaluation of abdominal pain.  His understanding was the drain was to be left in for 6 months starting in September (Dr. Bruce's note actually says 6 to 8 weeks).  He has also not followed up as there is also some issues with insurance.  He was doing well with scant output in the drain until yesterday afternoon when he noticed very mild bilateral lower abdominal pain reminiscent of the pain that he had when he required surgery which prompted his concern.  He did have a little nausea though he thinks this may be related to anxiety.  He did not have fever or vomiting with slightly looser stool today.    Independent Historian:    As above    Review of External Notes:  Discharge summary 6/29/2023 after hospitalization for small bowel mesenteric abscess requiring exploratory laparoscopy converted to laparotomy and CT-guided drain placement into an intra-abdominal abscess.  General surgery note 9/11/2023 which documents the patient was to follow-up in 6 to 8 weeks.    Medications:    No routine medications    Past Medical History:    Viscus perforation with intra-abdominal abscess and enterocutaneous fistula    Past Surgical History:    Past Surgical History:   Procedure Laterality Date    LAPAROSCOPY DIAGNOSTIC (GYN) N/A 6/21/2023    Procedure: Exploratory Laparoscopy; Converted to open; Appendectomy; Abdominal Lavage; Drain placement;  Surgeon: Teddy Bruce MD;  Location:  OR      Physical Exam   Patient Vitals for the past 24 hrs:   BP Temp Temp src Pulse Resp SpO2 Height Weight   05/07/24 2336 (!) 151/89 98.2  F (36.8  C) Temporal 115 16 100 % 2.032 m (6' 8\") (!) 171.9 kg (379 lb)   HR 0200 103 bpm  HR 0100 102 " bpm    Physical Exam  General: Well-developed and obese. Well appearing young  man. Cooperative.  Head:  Atraumatic.  Eyes:  Conjunctivae, lids, and sclerae are normal.  ENT:    Normal nose. Moist mucous membranes.  Neck:  Supple. Normal range of motion.  CV:  Tachycardic rate and regular rhythm. Normal heart sounds with no murmurs, rubs, or gallops detected.  Resp:  No respiratory distress. Clear to auscultation bilaterally without decreased breath sounds, wheezing, rales, or rhonchi.  GI:  Soft. Non-distended. Non-tender.  OUSMANE drain in the left lower abdomen draining scant dark output.    MS:  Normal ROM.  Skin:  Warm. Non-diaphoretic. No pallor.  Neuro:  Awake. A&Ox3. Normal strength.  Psych: Normal mood and affect. Normal speech.  Vitals reviewed.    Emergency Department Course   Imaging:  CT Abdomen Pelvis w Contrast   Final Result   IMPRESSION:    1. No evidence of acute pathology in the abdomen or pelvis.   2. Colonic diverticulosis predominantly of the sigmoid colon without CT evidence of acute diverticulitis.   3. There is left lower quadrant percutaneous drain where the tip loops in the left lower quadrant anterior abdominal wall musculature.   4. Hepatic steatosis.          Laboratory:  Labs Ordered and Resulted from Time of ED Arrival to Time of ED Departure   COMPREHENSIVE METABOLIC PANEL - Abnormal       Result Value    Sodium 144      Potassium 4.0      Carbon Dioxide (CO2) 23      Anion Gap 18 (*)     Urea Nitrogen 7.1      Creatinine 0.69      GFR Estimate >90      Calcium 9.4      Chloride 103      Glucose 91      Alkaline Phosphatase 109      AST 24      ALT 30      Protein Total 8.0      Albumin 4.2      Bilirubin Total 0.4     CBC WITH PLATELETS AND DIFFERENTIAL - Abnormal    WBC Count 11.1 (*)     RBC Count 5.54      Hemoglobin 14.9      Hematocrit 45.8      MCV 83      MCH 26.9      MCHC 32.5      RDW 13.2      Platelet Count 305      % Neutrophils 75      % Lymphocytes 20      %  Monocytes 5      % Eosinophils 0      % Basophils 0      % Immature Granulocytes 0      NRBCs per 100 WBC 0      Absolute Neutrophils 8.1      Absolute Lymphocytes 2.2      Absolute Monocytes 0.6      Absolute Eosinophils 0.0      Absolute Basophils 0.0      Absolute Immature Granulocytes 0.0      Absolute NRBCs 0.0     LIPASE - Normal    Lipase 17        Emergency Department Course & Assessments:    Interventions:  Medications   sodium chloride 0.9% BOLUS 1,000 mL (1,000 mLs Intravenous $New Bag 5/8/24 0049)     Assessments:  0215 I reevaluated patient.  He has declined urinalysis and would like to be discharged.  I strongly recommended he call general surgery tomorrow to discuss his drain.    Independent Interpretation (X-rays, CTs, rhythm strip):  I independently interpreted the CT and see the OUSMANE in the abdominal wall.    Consultations/Discussion of Management or Tests:  Not applicable    Social Determinants of Health affecting care:  Insurance issues       Disposition:  The patient was discharged.    Impression & Plan    Medical Decision Making:  Tej is a 28 year old man who had a viscus perforation complicated by abdominal abscess and enterocutaneous fistula for which he had a drain placed 6/27/2023.  He last saw general surgery for this in September 2023 and believes the drain was supposed to stay in for an additional 6 months (which would have been March 2024).  General surgery's note actually says that the patient is supposed to be seen in 6 to 8 weeks.  He is presenting to the emergency department because he is mild bilateral lower abdominal/pelvic pain.  He appears well on exam with a OUSMANE drain in place with very scant dark output.  He does not have significant abdominal tenderness.    Fortunately, CT of the abdomen and pelvis does not reveal recurrent perforation, viscus, or other acute pathology.  The OUSMANE drain, which has now been in for over 10 months, is coiled in the abdominal musculature.   His laboratory studies are unremarkable.     Patient is appropriate for discharge.  I do not think he needs a OUSMANE drain at all and certainly he does not need this one that has been in place for 10 months and is in an inappropriate position.  I counseled him on the importance of following up with general surgery to ensure the issues are resolved and to remove the OUSMANE drain.  He does cite some insurance issues as a barrier to care.  He will return with worsening symptoms or new concerns.  All questions answered.    Diagnosis:    ICD-10-CM    1. Pelvic pain  R10.2            Discharge Medications:  New Prescriptions    No medications on file      5/8/2024   Amarilis Gonzalez MD Dixson, Kylie S, MD  05/11/24 2531

## 2024-05-13 ENCOUNTER — OFFICE VISIT (OUTPATIENT)
Dept: SURGERY | Facility: CLINIC | Age: 29
End: 2024-05-13

## 2024-05-13 VITALS
BODY MASS INDEX: 36.45 KG/M2 | HEIGHT: 78 IN | RESPIRATION RATE: 18 BRPM | SYSTOLIC BLOOD PRESSURE: 144 MMHG | WEIGHT: 315 LBS | HEART RATE: 102 BPM | DIASTOLIC BLOOD PRESSURE: 90 MMHG | OXYGEN SATURATION: 98 %

## 2024-05-13 DIAGNOSIS — R19.8 PERFORATED VISCUS: Primary | ICD-10-CM

## 2024-05-13 PROCEDURE — 99212 OFFICE O/P EST SF 10 MIN: CPT | Performed by: SURGERY

## 2024-05-13 NOTE — PROGRESS NOTES
General surgery clinic note    Met with Mr. Chatman and his significant other to discuss care for his current pigtail catheter in the left lower abdomen.  After his abdominal exploration with bowel resection and drain placement he developed enterocutaneous fistula which was managed with percutaneous drain.  Several months back the communication to the bowel was still present.  For the last couple of months he has noticed a significant decrease in the output of the tube, his gastrointestinal function has been normal.  Last week due to the development of some abdominal pain he visited the [facility] the emergency room and a CT scan of the abdomen was performed.  The pigtail catheter appears to be withdrawn some from the previous location into the intramuscular space.  There was no evidence of abscess.  Given the improvement on his history, decrease of drainage, likely slow migration of the drain tube, likely his fistula has successfully sealed.  I discussed with him the options of removing the catheter with watchful waiting and imaging studies pending clinical response versus doing another sinogram to see if there is a communication and pending that removing the catheter or not.  After good discussion he would like to have the catheter removed today in clinic and follow-up as needed.    The pigtail catheter was removed without complications.  Dressing applied.  He will return to see us as needed.

## 2024-05-30 ENCOUNTER — OFFICE VISIT (OUTPATIENT)
Dept: FAMILY MEDICINE | Facility: CLINIC | Age: 29
End: 2024-05-30

## 2024-05-30 VITALS
BODY MASS INDEX: 36.45 KG/M2 | HEIGHT: 78 IN | OXYGEN SATURATION: 97 % | RESPIRATION RATE: 18 BRPM | SYSTOLIC BLOOD PRESSURE: 146 MMHG | HEART RATE: 98 BPM | TEMPERATURE: 98.4 F | DIASTOLIC BLOOD PRESSURE: 92 MMHG | WEIGHT: 315 LBS

## 2024-05-30 DIAGNOSIS — R10.32 ABDOMINAL PAIN, LEFT LOWER QUADRANT: Primary | ICD-10-CM

## 2024-05-30 DIAGNOSIS — R19.8 PERFORATED VISCUS: ICD-10-CM

## 2024-05-30 PROCEDURE — 99212 OFFICE O/P EST SF 10 MIN: CPT | Performed by: PHYSICIAN ASSISTANT

## 2024-05-30 RX ORDER — MULTIVITAMIN
1 TABLET ORAL DAILY
COMMUNITY

## 2024-05-30 ASSESSMENT — PAIN SCALES - GENERAL: PAINLEVEL: NO PAIN (0)

## 2024-05-30 NOTE — PROGRESS NOTES
"  Assessment & Plan     Abdominal pain, left lower quadrant  Perforated viscus  Abdominal pain resolved.  Catheter site well-healing.  No red flag symptoms requiring imaging/labs at this time.  Potentially could be related to episode of diarrhea and subsequent constipation.  Encouraged him to stay well-hydrated.  Reviewed signs symptoms that warrant urgent/emergent re-evaluation in the meantime.  He is comfortable with this plan.      20 minutes spent by me on the date of the encounter doing chart review, review of test results, interpretation of tests, patient visit, and documentation     MED REC REQUIRED  Post Medication Reconciliation Status: discharge medications reconciled and changed, per note/orders  BMI  Estimated body mass index is 48.44 kg/m  as calculated from the following:    Height as of this encounter: 1.973 m (6' 5.68\").    Weight as of this encounter: 188.6 kg (415 lb 11.2 oz).   Weight management plan: Discussed healthy diet and exercise guidelines        Nata Burnham is a 28 year old, presenting for the following health issues:  ER F/U (Pelvic pain.) and Establish Care    Here today for emergency department follow-up.    Evaluated on 5/7/2024.  Repeat CT scan ordered.  No recurrent perforation or additional pathology noted.  Does have colonic diverticulosis.  Followed up with his general surgeon on 5/13/2024.  Drain/catheter was removed without issues.  Had a 3 to 4-day history of left-sided abdominal pain over the area where his drain is placed.  Had no surrounding redness, drainage.  Did an episode of diarrhea last week and was constipated over the weekend.  This is resolved in addition to the abdominal pain.  No fever, chills, sweats.            5/30/2024     2:35 PM   Additional Questions   Roomed by Samantha GUERRERO           ED/UC Followup:    Facility:  [facility] ED  Date of visit: 05/07/2024  Reason for visit: Abdominal/pelvic pain  Current Status: Stable    Concern - " "Abdominal pain from tube drainage   Onset: 4 weeks  Description: Dull Muscular sore pain in left side of stomach  Intensity: mild  Progression of Symptoms:  waxing and waning  Accompanying Signs & Symptoms: None  Previous history of similar problem: None  Precipitating factors:        Worsened by: Standing or flexing stomach muscle  Alleviating factors:        Improved by: None  Therapies tried and outcome: None      Review of Systems  Constitutional, neuro, ENT, endocrine, pulmonary, cardiac, gastrointestinal, genitourinary, musculoskeletal, integument and psychiatric systems are negative, except as otherwise noted.      Objective    BP (!) 146/92   Pulse 98   Temp 98.4  F (36.9  C) (Oral)   Resp 18   Ht 1.973 m (6' 5.68\")   Wt (!) 188.6 kg (415 lb 11.2 oz)   SpO2 97%   BMI 48.44 kg/m    Body mass index is 48.44 kg/m .  Physical Exam   GENERAL: alert and no distress  EYES: Eyes grossly normal to inspection, PERRL and conjunctivae and sclerae normal  NECK: no adenopathy, no asymmetry, masses, or scars  RESP: Breathing unlabored  CV: regular rate and rhythm, normal S1 S2, no S3 or S4, no murmur, click or rub, no peripheral edema  ABDOMEN: soft, nontender, no hepatosplenomegaly, no masses and bowel sounds normal.  Catheter site without surrounding redness or tenderness.  No drainage.  MS: no gross musculoskeletal defects noted, no edema  SKIN: no suspicious lesions or rashes  NEURO: Normal strength and tone, mentation intact and speech normal  PSYCH: mentation appears normal, affect normal/bright    The likelihood of other entities in the differential is insufficient to justify any further testing for them at this time. This was explained to the patient. The patient was advised that persistent or worsening symptoms would require further evaluation. Patient advised to call the office and if unable to reach to go to the emergency room if they develop any new or worsening symptoms. Expressed understanding and " agreement with above stated plan.         Signed Electronically by: Long Hernandez PA-C

## 2024-06-28 ENCOUNTER — OFFICE VISIT (OUTPATIENT)
Dept: FAMILY MEDICINE | Facility: CLINIC | Age: 29
End: 2024-06-28

## 2024-06-28 VITALS
HEART RATE: 78 BPM | RESPIRATION RATE: 20 BRPM | TEMPERATURE: 97.3 F | SYSTOLIC BLOOD PRESSURE: 139 MMHG | BODY MASS INDEX: 49.79 KG/M2 | OXYGEN SATURATION: 99 % | DIASTOLIC BLOOD PRESSURE: 91 MMHG | WEIGHT: 315 LBS

## 2024-06-28 DIAGNOSIS — Z12.83 SKIN EXAM, SCREENING FOR CANCER: Primary | ICD-10-CM

## 2024-06-28 PROCEDURE — 99212 OFFICE O/P EST SF 10 MIN: CPT | Performed by: PHYSICIAN ASSISTANT

## 2024-06-28 ASSESSMENT — PAIN SCALES - GENERAL: PAINLEVEL: NO PAIN (0)

## 2024-06-28 NOTE — PROGRESS NOTES
Assessment & Plan     Skin exam, screening for cancer  Scattered benign nevi, cherry angiomas, acne vulgaris.  Reviewed acne over-the-counter treatment.  Recommended SPF 30 or greater sunscreen.  Recommended self skin checks.      10 minutes spent by me on the date of the encounter on chart review, review of test results, interpretation of tests, patient visit, and documentation         No follow-ups on file.    The likelihood of other entities in the differential is insufficient to justify any further testing for them at this time. This was explained to the patient. The patient was advised that persistent or worsening symptoms would require further evaluation. Patient advised to call the office and if unable to reach to go to the emergency room if they develop any new or worsening symptoms. Expressed understanding and agreement with above stated plan.     [facility]   Tej Borjatyler is a 28 year old male presenting for the following health issues:  Patient presents with:  Mole    Here today for evaluation of moles. Been a number of years since he has seen dermatology.   No main lesions of concern.     Review of Systems   Constitutional, HEENT, cardiovascular, pulmonary, GI, , musculoskeletal, neuro, skin, endocrine and psych systems are negative, except as otherwise noted.      Objective    BP (!) 139/91 (BP Location: Right arm, Patient Position: Sitting, Cuff Size: Adult Large)   Pulse 78   Temp 97.3  F (36.3  C) (Temporal)   Resp 20   Wt (!) 193.8 kg (427 lb 4.8 oz)   SpO2 99%   BMI 49.79 kg/m    Data Unavailable  427 lbs 4.8 oz    Physical Exam   GENERAL: healthy, alert and no distress  EYES: Eyes grossly normal to inspection, PERRL and conjunctivae and sclerae normal  NECK: no adenopathy, no asymmetry, masses, or scars and thyroid normal to palpation  RESP: lungs clear to auscultation - no rales, rhonchi or wheezes  CV: regular rate and  rhythm, normal S1 S2, no S3 or S4, no murmur, click or rub, no peripheral edema and peripheral pulses strong  SKIN: no suspicious lesions or rashes. Scattered nevi, cherry angiomas, acne vulgaris.   NEURO: Normal strength and tone, mentation intact and speech normal  PSYCH: mentation appears normal, affect normal/bright      Answers submitted by the patient for this visit:  General Questionnaire (Submitted on 6/28/2024)  Chief Complaint: Chronic problems general questions HPI Form  How many servings of fruits and vegetables do you eat daily?: 0-1  On average, how many sweetened beverages do you drink each day (Examples: soda, juice, sweet tea, etc.  Do NOT count diet or artificially sweetened beverages)?: 0  How many minutes a day do you exercise enough to make your heart beat faster?: 9 or less  How many days a week do you exercise enough to make your heart beat faster?: 3 or less  How many days per week do you miss taking your medication?: 0  General Concern (Submitted on 6/28/2024)  Chief Complaint: Chronic problems general questions HPI Form  What is the reason for your visit today?: moles  When did your symptoms begin?: More than a month  What are your symptoms?: moles  How would you describe these symptoms?: Moderate  Are your symptoms:: Staying the same  Have you had these symptoms before?: Yes  Have you tried or received treatment for these symptoms before?: No

## 2024-07-28 ENCOUNTER — HEALTH MAINTENANCE LETTER (OUTPATIENT)
Age: 29
End: 2024-07-28

## 2024-11-12 ENCOUNTER — VIRTUAL VISIT (OUTPATIENT)
Dept: FAMILY MEDICINE | Facility: CLINIC | Age: 29
End: 2024-11-12

## 2024-11-12 DIAGNOSIS — R09.82 PND (POST-NASAL DRIP): ICD-10-CM

## 2024-11-12 DIAGNOSIS — J20.9 ACUTE BRONCHITIS, UNSPECIFIED ORGANISM: Primary | ICD-10-CM

## 2024-11-12 PROCEDURE — 99213 OFFICE O/P EST LOW 20 MIN: CPT | Mod: 95 | Performed by: NURSE PRACTITIONER

## 2024-11-12 RX ORDER — PREDNISONE 20 MG/1
40 TABLET ORAL DAILY
Qty: 10 TABLET | Refills: 0 | Status: SHIPPED | OUTPATIENT
Start: 2024-11-12 | End: 2024-11-17

## 2024-11-12 RX ORDER — FLUTICASONE PROPIONATE 50 MCG
1 SPRAY, SUSPENSION (ML) NASAL DAILY
Qty: 16 G | Refills: 0 | Status: SHIPPED | OUTPATIENT
Start: 2024-11-12

## 2024-11-12 NOTE — PROGRESS NOTES
Tej is a 29 year old who is being evaluated via a billable video visit.    How would you like to obtain your AVS? [employer]  If the video visit is dropped, the invitation should be resent by: Text to cell phone: [phone]  Will anyone else be joining your video visit? No      Assessment & Plan     Acute bronchitis, unspecified organism  Symptoms consistent with viral illness. Likely allergies were exacerbated by vaccines. Less likely reaction to vaccines. That being said, recommended he wait until after allergy season next year (this year's weather has been so mild) and try  shots to see if he has any specific reaction. Also advised keeping benadryl at home in case he has a reaction in the future. Flonase and prednisone prescribed. If no improvement in symptoms by Thursday or Friday, willing to send abx. He will [employer] message me if needed.     - fluticasone (FLONASE) 50 MCG/ACT nasal spray; Spray 1 spray into both nostrils daily.  - predniSONE (DELTASONE) 20 MG tablet; Take 2 tablets (40 mg) by mouth daily for 5 days.    PND (post-nasal drip)  See above.     - fluticasone (FLONASE) 50 MCG/ACT nasal spray; Spray 1 spray into both nostrils daily.            Patient Instructions   Take prednisone as prescribed. It can increase hunger, thirst, urination, and irritability while on it. Take in the morning to prevent insomnia.    Use flonase as prescribed.     If symptoms are not improving by Thursday/Friday, please reach out via [employer] or by calling our clinic [phone] and I'll send an antibiotic to the pharmacy for you.     If respiratory status worsens, please go to ER.     Subjective   Tej is a 29 year old, presenting for the following health issues:  No chief complaint on file.        11/12/2024    12:47 PM   Additional Questions   Roomed by Josee   Accompanied by none         11/12/2024    12:47 PM   Patient Reported Additional Medications   Patient reports taking the following new  medications none     History of Present Illness       Reason for visit:  Sick   He is taking medications regularly.       Acute Illness  Acute illness concerns: patient has felt sick since he got his flu and covid shots, feels like it is hard to get air out of his lungs   Onset/Duration: 11/05 (got his shot 11/01 but started feeling sick right after but started getting worse)  Symptoms:  Fever: No  Chills/Sweats: No  Headache (location?): YES about a week ago  Sinus Pressure: No  Conjunctivitis:  No  Ear Pain: no  Rhinorrhea: No  Congestion: YES  Sore Throat: YES  Cough: YES-non-productive  Wheeze: No  Decreased Appetite: No  Nausea: No  Vomiting: No  Diarrhea: No  Dysuria/Freq.: No  Dysuria or Hematuria: No  Fatigue/Achiness: No  Sick/Strep Exposure: No  Therapies tried and outcome:       Additional provider notes: Patient presents in clinic for the following:     Cold symptoms: flu and covid vaccines on 11/1/24 and then had cold symptoms start 11/2/24. Woke up on the 3rd and felt worse. By the 5th he woke up having a hard time breathing, but that went away the next day. Since then he has had a bad cough, lost his voice, thick phlegm. Feels he is struggling to get air out of his throat.   -states he had COVID shot in 2021  -has had flu shots before in the past.   -history of hayfever when he was younger. Hx of bronchitis yearly as a kid.   -occasionally vapes, but usually does chew.     Allergies   Allergen Reactions    Erythromycin Unknown     Occurred as a baby. Does not remember reaction.       Current Outpatient Medications   Medication Sig Dispense Refill    multivitamin w/minerals (MULTI-VITAMIN) tablet Take 1 tablet by mouth daily       No current facility-administered medications for this visit.       No past medical history on file.         Review of Systems  Constitutional, HEENT, cardiovascular, pulmonary, gi and gu systems are negative, except as otherwise noted.      Objective    Vitals - Patient  "Reported  Weight (Patient Reported): 170.1 kg (375 lb)  Height (Patient Reported): 197.3 cm (6' 5.68\")  BMI (Based on Pt Reported Ht/Wt): 43.69  Pain Score: No Pain (0)        Physical Exam   GENERAL: alert and no distress, hoarse voice  EYES: Eyes grossly normal to inspection.  No discharge or erythema, or obvious scleral/conjunctival abnormalities.  RESP: No audible wheeze, cough, or visible cyanosis.  Occasional cough  SKIN: Visible skin clear. No significant rash, abnormal pigmentation or lesions.  NEURO: Cranial nerves grossly intact.  Mentation and speech appropriate for age.  PSYCH: Appropriate affect, tone, and pace of words          Video-Visit Details    Type of service:  Video Visit   9 minutes 27 sec  Originating Location (pt. Location): Home    Distant Location (provider location):  On-site  Platform used for Video Visit: Colt  Signed Electronically by: Betty Moran DNP    "

## 2024-11-12 NOTE — PATIENT INSTRUCTIONS
Take prednisone as prescribed. It can increase hunger, thirst, urination, and irritability while on it. Take in the morning to prevent insomnia.    Use flonase as prescribed.     If symptoms are not improving by Thursday/Friday, please reach out via [email or web address] or by calling our clinic [phone] and I'll send an antibiotic to the pharmacy for you.     If respiratory status worsens, please go to ER.

## 2025-04-22 ENCOUNTER — OFFICE VISIT (OUTPATIENT)
Dept: FAMILY MEDICINE | Facility: CLINIC | Age: 30
End: 2025-04-22

## 2025-04-22 VITALS
WEIGHT: 315 LBS | SYSTOLIC BLOOD PRESSURE: 155 MMHG | TEMPERATURE: 98.6 F | DIASTOLIC BLOOD PRESSURE: 80 MMHG | OXYGEN SATURATION: 98 % | RESPIRATION RATE: 20 BRPM | HEIGHT: 78 IN | BODY MASS INDEX: 36.45 KG/M2 | HEART RATE: 95 BPM

## 2025-04-22 DIAGNOSIS — Z12.83 SKIN EXAM, SCREENING FOR CANCER: Primary | ICD-10-CM

## 2025-04-22 DIAGNOSIS — R03.0 ELEVATED BP WITHOUT DIAGNOSIS OF HYPERTENSION: ICD-10-CM

## 2025-04-22 DIAGNOSIS — E66.813 CLASS 3 SEVERE OBESITY WITHOUT SERIOUS COMORBIDITY WITH BODY MASS INDEX (BMI) OF 45.0 TO 49.9 IN ADULT, UNSPECIFIED OBESITY TYPE (H): ICD-10-CM

## 2025-04-22 PROCEDURE — G2211 COMPLEX E/M VISIT ADD ON: HCPCS | Performed by: PHYSICIAN ASSISTANT

## 2025-04-22 PROCEDURE — 99213 OFFICE O/P EST LOW 20 MIN: CPT | Performed by: PHYSICIAN ASSISTANT

## 2025-04-22 ASSESSMENT — PAIN SCALES - GENERAL: PAINLEVEL_OUTOF10: NO PAIN (0)

## 2025-04-22 NOTE — PATIENT INSTRUCTIONS
-Please check your blood pressure after sitting for 5 minutes 2x weekly for the next 3 weeks and let me know if it is staying above 130/80.     [facility]    [facility]  [address] [place] [address], [address]  [place], [place] [address] [phone]     [facility]    [address], [place] [address]  [phone]    [facility]:     [place] [address]  [address] [facility] [address] [place] [address]  Main Phone: [phone]    OR    [facility] [address]  [address] [place], [place] [address] [phone]    [facility]:     [address] [facility] [address]  [place], [place] [address] [phone]

## 2025-04-22 NOTE — PROGRESS NOTES
"Assessment & Plan     Skin exam, screening for cancer  Recommended dermatology evaluation for further assessment as well as for full-body skin check.  Referral placed.   - Adult Dermatology  Referral    Elevated BP without diagnosis of hypertension  Class 3 severe obesity without serious comorbidity with body mass index (BMI) of 45.0 to 49.9 in adult, unspecified obesity type    BP significantly elevated today in office.  Notes recent stressors and lack of sleep last night.  Recommended at home blood pressure monitoring over the next few weeks and he will update me via MyChart with readings.  Healthy diet, weight loss, exercise are imperative.  If readings are >130/80 consider initiation of medication.  CBC/BMP over the past year reviewed.  He declines additional labs today which I think is reasonable.  Consider thyroid studies in future.      20 minutes spent by me on the date of the encounter on chart review, review of test results, interpretation of tests, patient visit, and documentation       BMI  Estimated body mass index is 49.73 kg/m  as calculated from the following:    Height as of this encounter: 1.973 m (6' 5.68\").    Weight as of this encounter: 193.6 kg (426 lb 12.8 oz).   Weight management plan: Discussed healthy diet and exercise guidelines      No follow-ups on file.    The longitudinal plan of care for the diagnosis(es)/condition(s) as documented were addressed during this visit. Due to the added complexity in care, I will continue to support Tej in the subsequent management and with ongoing continuity of care.    The likelihood of other entities in the differential is insufficient to justify any further testing for them at this time. This was explained to the patient. The patient was advised that persistent or worsening symptoms would require further evaluation. Patient advised to call the office and if unable to reach to go to the emergency room if they develop any new or worsening " "symptoms. Expressed understanding and agreement with above stated plan.     [facility]    Subjective   Tej Chatman is a very pleasant 29 year old male presenting for the following health issues:  Patient presents with:  Derm Problem    Here today for concerns over a new mole which appeared on his left anterior shoulder.  He has a significant amount of nevi.  This one is new over the past few months.  Raised.  Does not bleed or scab.      Review of Systems   Constitutional, HEENT, cardiovascular, pulmonary, GI, , musculoskeletal, neuro, skin, endocrine and psych systems are negative, except as otherwise noted.      Objective    BP (!) 155/80   Pulse 95   Temp 98.6  F (37  C) (Temporal)   Resp 20   Ht 1.973 m (6' 5.68\")   Wt (!) 193.6 kg (426 lb 12.8 oz)   SpO2 98%   BMI 49.73 kg/m    6' 5.68\"  426 lbs 12.8 oz    Physical Exam   GENERAL: healthy, alert and no distress  EYES: Eyes grossly normal to inspection, PERRL and conjunctivae and sclerae normal  NECK: no adenopathy, no asymmetry, masses, or scars and thyroid normal to palpation  RESP: lungs clear to auscultation - no rales, rhonchi or wheezes  CV: regular rate and rhythm, normal S1 S2, no S3 or S4, no murmur, click or rub, no peripheral edema and peripheral pulses strong  MS: no gross musculoskeletal defects noted, no edema  SKIN: Raised brown speckled lesion located left anterior shoulder.  Significant amount of nevi.  No additional suspicious lesions or rashes  NEURO: Normal strength and tone, mentation intact and speech normal  PSYCH: mentation appears normal, affect normal/bright      Answers submitted by the patient for this visit:  General Questionnaire (Submitted on 4/21/2025)  Chief Complaint: Chronic problems general questions HPI Form  What is the reason for your visit today? : suspicious mole on shoulder  How many servings of fruits and vegetables do you eat daily?: 2-3  On average, how many " sweetened beverages do you drink each day (Examples: soda, juice, sweet tea, etc.  Do NOT count diet or artificially sweetened beverages)?: 0  How many minutes a day do you exercise enough to make your heart beat faster?: 9 or less  How many days a week do you exercise enough to make your heart beat faster?: 3 or less  How many days per week do you miss taking your medication?: 0  Questionnaire about: Chronic problems general questions HPI Form (Submitted on 4/21/2025)  Chief Complaint: Chronic problems general questions HPI Form

## 2025-04-29 ENCOUNTER — TELEPHONE (OUTPATIENT)
Dept: DERMATOLOGY | Facility: CLINIC | Age: 30
End: 2025-04-29

## 2025-04-29 NOTE — TELEPHONE ENCOUNTER
This encounter is being sent to inform the clinic that this patient has a referral from Long Hernandez PA-C for the diagnoses of Skin Lesion,  and has requested that this patient be seen within 1-2 weeks.  Based on the availability of our provider(s), we are unable to accommodate this request.    Were all sites offered this patient?  Yes    Does scheduling algorithm request to schedule next available?  Patient has been scheduled for the first available opening with Shona Monterroso PA-C on 11/7/25.  We have informed the patient that the clinic will review their referral and reach out if a sooner appointment is medically necessary.

## 2025-04-30 NOTE — TELEPHONE ENCOUNTER
Patient Contact    Attempt # 2    Was call answered?  No    Left message on answering machine for patient to call back.    Brenna SCHULTZ RN  Dermatology   [phone]

## 2025-04-30 NOTE — TELEPHONE ENCOUNTER
[facility] Call Center    Phone Message    May a detailed message be left on voicemail: yes     Reason for Call: Other: pt is returning call, after noon is best to reach pt. Thanks      Action Taken: Message routed to:  Other: [facility]    Travel Screening: Not Applicable     Date of Service:

## 2025-04-30 NOTE — TELEPHONE ENCOUNTER
Patient Contact    Attempt # 1    Was call answered?  No    Left message on answering machine for patient to call back.    Brenna SCHULTZ RN  Dermatology   [phone]

## 2025-08-10 ENCOUNTER — HEALTH MAINTENANCE LETTER (OUTPATIENT)
Age: 30
End: 2025-08-10

## (undated) DEVICE — SUCTION IRR STRYKERFLOW II W/TIP 250-070-520

## (undated) DEVICE — SU PDS II 0 CTX 60" Z990G

## (undated) DEVICE — TUBING SUCTION 12"X1/4" N612

## (undated) DEVICE — DECANTER BAG 2002S

## (undated) DEVICE — DRAIN JACKSON PRATT RESERVOIR 100ML SU130-1305

## (undated) DEVICE — LINEN TOWEL PACK X5 5464

## (undated) DEVICE — ENDO TROCAR SLEEVE KII Z-THREADED 05X100MM CTS02

## (undated) DEVICE — CATH TRAY FOLEY COUDE SURESTEP 16FR W/URNE MTR STLK A304716A

## (undated) DEVICE — ESU ELEC BLADE 6" COATED/INSULATED E1455-6

## (undated) DEVICE — STPL ENDO ARTICULATING 45MM EC45A

## (undated) DEVICE — ENDO POUCH UNIV RETRIEVAL SYSTEM INZII 10MM CD001

## (undated) DEVICE — SOL NACL 0.9% IRRIG 1000ML BOTTLE 2F7124

## (undated) DEVICE — RX VISTASEAL FIBRIN SEALANT W/THROMBIN 4ML VST04

## (undated) DEVICE — SUCTION IRRIGATION STRYKFLOW II W/TIP DISP 250-070-520

## (undated) DEVICE — SU VICRYL 3-0 SH 27" J316H

## (undated) DEVICE — Device

## (undated) DEVICE — ENDO SCOPE WARMER LF TM500

## (undated) DEVICE — PREP DURAPREP 26ML APL 8630

## (undated) DEVICE — STPL SKIN 35W 6.9MM  PXW35

## (undated) DEVICE — ESU PENCIL W/HOLSTER E2350H

## (undated) DEVICE — ENDO TROCAR FIRST ENTRY KII FIOS Z-THRD 05X100MM CTF03

## (undated) DEVICE — SOL WATER IRRIG 1000ML BOTTLE 2F7114

## (undated) DEVICE — DRAIN JACKSON PRATT CHANNEL 19FR ROUND HUBLESS SIL JP-2230

## (undated) DEVICE — SU VICRYL 0 UR-6 27" J603H

## (undated) DEVICE — ESU HOLDER LAP INST DISP PURPLE LONG 330MM H-PRO-330

## (undated) DEVICE — ESU CORD MONOPOLAR 10'  E0510

## (undated) DEVICE — STPL RELOAD REG TISSUE ECHELON 45 X 3.6MM BLUE GST45B

## (undated) DEVICE — SU ETHILON 3-0 FS-1 18" 669H

## (undated) DEVICE — SOL NACL 0.9% INJ 1000ML BAG 2B1324X

## (undated) DEVICE — ESU LIGASURE IMPACT OPEN SEALER/DVDR CVD LG JAW LF4418

## (undated) RX ORDER — ONDANSETRON 2 MG/ML
INJECTION INTRAMUSCULAR; INTRAVENOUS
Status: DISPENSED
Start: 2023-06-21

## (undated) RX ORDER — PROPOFOL 10 MG/ML
INJECTION, EMULSION INTRAVENOUS
Status: DISPENSED
Start: 2023-06-21

## (undated) RX ORDER — FENTANYL CITRATE 50 UG/ML
INJECTION, SOLUTION INTRAMUSCULAR; INTRAVENOUS
Status: DISPENSED
Start: 2023-06-21

## (undated) RX ORDER — FLUMAZENIL 0.1 MG/ML
INJECTION, SOLUTION INTRAVENOUS
Status: DISPENSED
Start: 2023-06-27

## (undated) RX ORDER — HYDROMORPHONE HYDROCHLORIDE 1 MG/ML
INJECTION, SOLUTION INTRAMUSCULAR; INTRAVENOUS; SUBCUTANEOUS
Status: DISPENSED
Start: 2023-06-21

## (undated) RX ORDER — DEXAMETHASONE SODIUM PHOSPHATE 4 MG/ML
INJECTION, SOLUTION INTRA-ARTICULAR; INTRALESIONAL; INTRAMUSCULAR; INTRAVENOUS; SOFT TISSUE
Status: DISPENSED
Start: 2023-06-21

## (undated) RX ORDER — HYDROMORPHONE HCL IN WATER/PF 6 MG/30 ML
PATIENT CONTROLLED ANALGESIA SYRINGE INTRAVENOUS
Status: DISPENSED
Start: 2023-06-21

## (undated) RX ORDER — FENTANYL CITRATE 50 UG/ML
INJECTION, SOLUTION INTRAMUSCULAR; INTRAVENOUS
Status: DISPENSED
Start: 2023-06-27

## (undated) RX ORDER — NALOXONE HYDROCHLORIDE 0.4 MG/ML
INJECTION, SOLUTION INTRAMUSCULAR; INTRAVENOUS; SUBCUTANEOUS
Status: DISPENSED
Start: 2023-06-27